# Patient Record
Sex: FEMALE | Race: WHITE
[De-identification: names, ages, dates, MRNs, and addresses within clinical notes are randomized per-mention and may not be internally consistent; named-entity substitution may affect disease eponyms.]

---

## 2018-04-26 NOTE — EDM.PDOC
ED HPI GENERAL MEDICAL PROBLEM





- General


Chief Complaint: Laceration


Time Seen by Provider: 03/18/18 17:30


Source of Information: Reports: Patient


History Limitations: Reports: No Limitations





- History of Present Illness


INITIAL COMMENTS - FREE TEXT/NARRATIVE: 





Pt. states that she fell on the ice getting out of her truck. She sustained a 

laceration to the volar aspect of her R forearm, and landed on her elbows, 

injuring both her elbows and her shoulders. Did not strike her head. No LOC. 

Denies any neck pain post fall. States that her tetanus is not UTD.


Onset: Today


Location: Reports: Upper Extremity, Right


Quality: Reports: Ache (both shoulders)


Severity: Moderate


Improves with: Reports: Rest


Worsens with: Reports: Movement





- Related Data


 Allergies











Allergy/AdvReac Type Severity Reaction Status Date / Time


 


No Known Allergies Allergy   Verified 03/18/18 17:40











Home Meds: 


 Home Meds





. [No Known Home Meds]  03/18/18 [History]











Past Medical History





- Past Health History


Medical/Surgical History: Denies Medical/Surgical History





Social & Family History





- Tobacco Use


Smoking Status *Q: Unknown Ever Smoked





ED ROS GENERAL





- Review of Systems


Review Of Systems: See Below


Respiratory: Reports: No Symptoms


Cardiovascular: Reports: No Symptoms


Musculoskeletal: Reports: No Symptoms, Shoulder Pain, Muscle Stiffness, Other (

elbow pain).  Denies: Neck Pain


Skin: Reports: No Symptoms


Neurological: Reports: No Symptoms





ED EXAM, SKIN/RASH


Exam: See Below


Exam Limited By: No Limitations


General Appearance: Alert, WD/WN, No Apparent Distress


Head: Atraumatic, Normocephalic


Neck: Normal Inspection, Supple, Non-Tender, Full Range of Motion


Respiratory/Chest: No Respiratory Distress, Lungs Clear, Normal Breath Sounds, 

No Accessory Muscle Use, Chest Non-Tender


Peripheral Pulses: 4+: Radial (L), Radial (R)


Back Exam: Normal Inspection, Full Range of Motion, NT


Extremities: Normal Range of Motion, Normal Capillary Refill, Other (pain on 

manipulation of shoulders and elbows. 2x3 cm v-shaped laceration to volar 

aspect of R forearm)


Neurological: Alert, Oriented, CN II-XII Intact, Normal Cognition, Normal Gait, 

Normal Reflexes, No Motor/Sensory Deficits


Psychiatric: Normal Affect, Normal Mood


Skin: Warm, Dry, Intact, Normal Color, No Rash





ED SKIN PROCEDURES





- Laceration/Wound Repair


  ** Right Ventral Arm


Lac/Wound length In cm: 5


Appearance: Subcutaneous


Distal NVT: Neuro & Vascular Intact, No Tendon Injury


Anesthetic Type: Local


Local Anesthesia - Lidocaine (Xylocaine): 1% Plain


Local Anesthetic Volume: 4cc


Skin Prep: Chlorhexidine (Hibiciens), Saline


Exploration/Debridement/Repair: Wound Explored, Explored to Base, Minimal 

Debridement


Closed with: Sutures


Suture Size: 4-0


# of Sutures: 3


Suture Type: Nylon





Course





- Vital Signs


Last Recorded V/S: 





 Last Vital Signs











Temp  36.1 C   03/18/18 17:30


 


Pulse  88   03/18/18 17:30


 


Resp  14   03/18/18 17:30


 


BP  150/62 H  03/18/18 17:30


 


Pulse Ox  100   03/18/18 17:30














- Orders/Labs/Meds


Orders: 





 Active Orders 24 hr











 Category Date Time Status


 


 Vaccines to be Administered [RC] PER UNIT ROUTINE Care  03/18/18 18:12 Ordered











Meds: 





Medications














Discontinued Medications














Generic Name Dose Route Start Last Admin





  Trade Name Shelia  PRN Reason Stop Dose Admin


 


Diphtheria/Tetanus/Acell Pertussis  0.5 ml  03/18/18 18:11  03/18/18 18:16





  Adacel  IM  03/18/18 18:12  0.5 ml





  .ONCE ONE   Administration


 


Lidocaine HCl  30 ml  03/18/18 17:44  03/18/18 17:49





  Xylocaine-Mpf 1%  INJECT  03/18/18 17:45  30 ml





  ONETIME ONE   Administration














Departure





- Departure


Time of Disposition: 18:20


Disposition: Home, Self-Care 01


Condition: Good


Clinical Impression: 


 Laceration








- Discharge Information


Instructions:  Laceration Care, Adult


Forms:  ED Department Discharge


Additional Instructions: 


Keep dry for 24 hours. 





Keep covered if still oozing blood, otherwise keep open to air as much as 

possible.





Return if redness, swelling, or discharge from the area.





Return if increased discomfort in shoulder, elbow, or elsewhere.





Suture out in 12 days.





- My Orders


Last 24 Hours: 





My Active Orders





03/18/18 18:12


Vaccines to be Administered [RC] PER UNIT ROUTINE 














- Assessment/Plan


Last 24 Hours: 





My Active Orders





03/18/18 18:12


Vaccines to be Administered [RC] PER UNIT ROUTINE Billing Type: Third-Party Bill

## 2020-04-10 ENCOUNTER — HOSPITAL ENCOUNTER (EMERGENCY)
Dept: HOSPITAL 50 - VM.ED | Age: 45
Discharge: ANOTHER HOSPITAL | End: 2020-04-10
Payer: COMMERCIAL

## 2020-04-10 DIAGNOSIS — K46.9: Primary | ICD-10-CM

## 2020-04-10 DIAGNOSIS — Z98.84: ICD-10-CM

## 2020-04-10 DIAGNOSIS — Z90.49: ICD-10-CM

## 2020-04-10 DIAGNOSIS — F17.210: ICD-10-CM

## 2020-04-10 LAB
ANION GAP SERPL CALC-SCNC: 12.5 MMOL/L (ref 10–20)
CHLORIDE SERPL-SCNC: 99 MMOL/L (ref 98–107)
SODIUM SERPL-SCNC: 131 MMOL/L (ref 136–145)

## 2020-04-10 PROCEDURE — P9016 RBC LEUKOCYTES REDUCED: HCPCS

## 2020-04-10 RX ADMIN — KETOROLAC TROMETHAMINE ONE MG: 30 INJECTION, SOLUTION INTRAMUSCULAR at 19:49

## 2020-04-10 RX ADMIN — KETOROLAC TROMETHAMINE ONE: 30 INJECTION, SOLUTION INTRAMUSCULAR at 20:34

## 2020-04-10 NOTE — EDM.PDOC
ED HPI GENERAL MEDICAL PROBLEM





- General


Chief Complaint: Abdominal Pain


Stated Complaint: CHEST PAIN


Time Seen by Provider: 04/10/20 18:50


Source of Information: Reports: Patient


History Limitations: Reports: No Limitations





- History of Present Illness


INITIAL COMMENTS - FREE TEXT/NARRATIVE: 





Patient comes emergency department today from home with complaints of abdominal 

epigastric pain nausea and vomiting.  This patient had a gastric bypass about 7 

years ago and really has not had any problems since.  Starting about 4:00 this 

morning she developed a severe abdominal epigastric pain that has been 

continuous throughout the day.  She has been nauseated vomiting and dry 

heaving.  The pain goes into her back.  She has had her gallbladder removed in 

the past.  No fever no chills.  No hematuria dysuria or urinary frequency.  She 

has been passing flatus today and she is had normal bowel movements.


  ** Middle Epigastric


Pain Score (Numeric/FACES): 10





- Related Data


 Allergies











Allergy/AdvReac Type Severity Reaction Status Date / Time


 


No Known Allergies Allergy   Verified 04/10/20 18:54











Home Meds: 


 Home Meds





. [No Known Home Meds]  03/18/18 [History]











Past Medical History





- Past Health History


Medical/Surgical History: Denies Medical/Surgical History





- Past Surgical History


GI Surgical History: Reports: Bariatric Procedure, Cholecystectomy





Social & Family History





- Tobacco Use


Smoking Status *Q: Current Every Day Smoker


Years of Tobacco use: 12


Packs/Tins Daily: 1





- Alcohol Use


Days Per Week of Alcohol Use: 1


Number of Drinks Per Day: 4


Total Drinks Per Week: 4





- Recreational Drug Use


Recreational Drug Use: No





ED ROS GENERAL





- Review of Systems


Review Of Systems: Comprehensive ROS is negative, except as noted in HPI.





ED EXAM, GI/ABD





- Physical Exam


Exam: See Below


Exam Limited By: No Limitations


General Appearance: Alert, WD/WN, Moderate Distress


Eyes: Bilateral: EOMI


Ears: Normal External Exam


Nose: Normal Inspection


Throat/Mouth: Normal Inspection


Head: Atraumatic, Other


Neck: Normal Inspection, Supple


Respiratory/Chest: No Respiratory Distress, Lungs Clear, Normal Breath Sounds, 

No Accessory Muscle Use


Cardiovascular: Normal Peripheral Pulses, Regular Rate, Rhythm


GI/Abdominal Exam: Normal Bowel Sounds, Soft, Guarding (Epigastric region), 

Tender (Has generalized tenderness throughout her abdomen more noticeable in 

the epigastric region).  No: Distended, Rigid, Rebound


Back Exam: Normal Inspection


Extremities: Normal Inspection, Normal Range of Motion, No Pedal Edema


Neurological: Alert, Oriented, Normal Cognition, No Motor/Sensory Deficits


Psychiatric: Normal Affect, Normal Mood


Skin Exam: Intact, No Rash, Diaphoretic, Pallor





Course





- Vital Signs


Last Recorded V/S: 


 Last Vital Signs











Temp  37.0 C   04/10/20 18:35


 


Pulse  65   04/10/20 18:35


 


Resp  16   04/10/20 18:35


 


BP  159/63 H  04/10/20 18:35


 


Pulse Ox  98   04/10/20 18:35














- Orders/Labs/Meds


Orders: 


 Active Orders 24 hr











 Category Date Time Status


 


 Abdomen 2V AP Flat Upright [CR] Stat Exams  04/10/20 19:08 Taken


 


 Abdomen Pelvis w Cont [CT] Stat Exams  04/10/20 20:08 Taken


 


 RED BLOOD CELLS LP [BBK] Stat Lab  04/10/20 19:16 Results


 


 TYPE AND SCREEN [BBK] Stat Lab  04/10/20 19:16 Results


 


 UA RFX KALINA AND CULT IF INDIC [URIN] Stat Lab  04/10/20 19:06 Ordered


 


 Lactated Ringers [Ringers, Lactated] 1,000 ml Med  04/10/20 22:56 Active





 IV ONETIME   


 


 Sodium Chloride 0.9% [Saline Flush] Med  04/10/20 19:08 Active





 10 ml FLUSH ASDIRECTED PRN   


 


 Blood Transfusion Reflex Orders [OM.PC] Routine Oth  04/10/20 20:13 Ordered


 


 Peripheral IV Insertion Adult [OM.PC] Stat Oth  04/10/20 19:05 Ordered


 


 Transfuse PRBC [Transfuse Red Blood Cells] [COMM] Stat Oth  04/10/20 20:06 

Ordered








 Medication Orders





Lactated Ringer's (Ringers, Lactated)  1,000 mls @ 125 mls/hr IV ONETIME ONE


   Stop: 04/11/20 06:55


Sodium Chloride (Saline Flush)  10 ml FLUSH ASDIRECTED PRN


   PRN Reason: Keep Vein Open








Labs: 


 Laboratory Tests











  04/10/20 04/10/20 04/10/20 Range/Units





  19:16 19:16 19:16 


 


WBC  5.4    (4.0-10.0)  x10^3/uL


 


RBC  3.60 L    (4.00-5.50)  x10^6/uL


 


Hgb  5.5 L*    (12.0-16.0)  g/dL


 


Hct  21.6 L    (33.0-47.0)  %


 


MCV  60.0 L    (78.0-93.0)  fL


 


MCH  15.3 L    (26.0-32.0)  pg


 


MCHC  25.5 L    (32.0-36.0)  g/dL


 


RDW Coeff of Jeramie  22.0 H    (10.0-15.0)  %


 


Plt Count  359    (130-400)  x10^3/uL


 


Add Manual Diff  Yes    


 


Neutrophils % (Manual)  83 H    (50-80)  %


 


Lymphocytes % (Manual)  12 L    (25-50)  %


 


Monocytes % (Manual)  4    (2-11)  %


 


Eosinophils % (Manual)  1    (0-4)  %


 


Platelet Estimate  Adequate    


 


Hypochromasia  3+ marked H    


 


Anisocytosis  3+ marked H    


 


Tear Drop Cells  1+ slight H    


 


Ovalocytes  1+ slight H    


 


Sodium   131 L   (136-145)  mmol/L


 


Potassium   4.5   (3.5-5.1)  mmol/L


 


Chloride   99   ()  mmol/L


 


Carbon Dioxide   24   (21-32)  mmol/L


 


Anion Gap   12.5   (10-20)  mmol/L


 


BUN   7   (7-18)  mg/dL


 


Creatinine   0.5 L   (0.55-1.02)  mg/dL


 


Est Cr Clr Drug Dosing   134.41   mL/min


 


Estimated GFR (MDRD)   > 60   


 


Glucose   146 H   ()  mg/dL


 


Lactic Acid    1.3  (0.4-2.0)  mmol/L


 


Calcium   8.0 L   (8.5-10.1)  mg/dL


 


Corrected Calcium   8.40 L   (8.5-10.1)  mg/dL


 


Total Bilirubin   0.6   (0.2-1.0)  mg/dL


 


AST   20   (15-37)  U/L


 


ALT   20   (14-59)  U/L


 


Alkaline Phosphatase   102   ()  U/L


 


C-Reactive Protein   < 0.2   (<=0.9)  mg/dL


 


Total Protein   6.8   (6.4-8.2)  g/dL


 


Albumin   3.5   (3.4-5.0)  g/dL


 


Globulin   3.3   


 


Albumin/Globulin Ratio   1.06   


 


Blood Type     


 


Gel Antibody Screen     


 


Crossmatch     














  04/10/20 Range/Units





  19:16 


 


WBC   (4.0-10.0)  x10^3/uL


 


RBC   (4.00-5.50)  x10^6/uL


 


Hgb   (12.0-16.0)  g/dL


 


Hct   (33.0-47.0)  %


 


MCV   (78.0-93.0)  fL


 


MCH   (26.0-32.0)  pg


 


MCHC   (32.0-36.0)  g/dL


 


RDW Coeff of Jeramie   (10.0-15.0)  %


 


Plt Count   (130-400)  x10^3/uL


 


Add Manual Diff   


 


Neutrophils % (Manual)   (50-80)  %


 


Lymphocytes % (Manual)   (25-50)  %


 


Monocytes % (Manual)   (2-11)  %


 


Eosinophils % (Manual)   (0-4)  %


 


Platelet Estimate   


 


Hypochromasia   


 


Anisocytosis   


 


Tear Drop Cells   


 


Ovalocytes   


 


Sodium   (136-145)  mmol/L


 


Potassium   (3.5-5.1)  mmol/L


 


Chloride   ()  mmol/L


 


Carbon Dioxide   (21-32)  mmol/L


 


Anion Gap   (10-20)  mmol/L


 


BUN   (7-18)  mg/dL


 


Creatinine   (0.55-1.02)  mg/dL


 


Est Cr Clr Drug Dosing   mL/min


 


Estimated GFR (MDRD)   


 


Glucose   ()  mg/dL


 


Lactic Acid   (0.4-2.0)  mmol/L


 


Calcium   (8.5-10.1)  mg/dL


 


Corrected Calcium   (8.5-10.1)  mg/dL


 


Total Bilirubin   (0.2-1.0)  mg/dL


 


AST   (15-37)  U/L


 


ALT   (14-59)  U/L


 


Alkaline Phosphatase   ()  U/L


 


C-Reactive Protein   (<=0.9)  mg/dL


 


Total Protein   (6.4-8.2)  g/dL


 


Albumin   (3.4-5.0)  g/dL


 


Globulin   


 


Albumin/Globulin Ratio   


 


Blood Type  A POSITIVE  


 


Gel Antibody Screen  Negative  


 


Crossmatch  See Detail  











Meds: 


Medications











Generic Name Dose Route Start Last Admin





  Trade Name Freq  PRN Reason Stop Dose Admin


 


Lactated Ringer's  1,000 mls @ 125 mls/hr  04/10/20 22:56  





  Ringers, Lactated  IV  04/11/20 06:55  





  ONETIME ONE   





     





     





     





     


 


Sodium Chloride  10 ml  04/10/20 19:08  





  Saline Flush  FLUSH   





  ASDIRECTED PRN   





  Keep Vein Open   





     





     





     














Discontinued Medications














Generic Name Dose Route Start Last Admin





  Trade Name Freq  PRN Reason Stop Dose Admin


 


Al Hydroxide/Mg Hydroxide  30 ml  04/10/20 18:42  04/10/20 18:50





  Gi Cocktail  PO  04/10/20 18:43  30 ml





  ONETIME ONE   Administration





     





     





     





     


 


Diphenhydramine HCl  25 mg  04/10/20 19:07  04/10/20 19:51





  Benadryl  IVPUSH  04/10/20 19:08  25 mg





  ONETIME ONE   Administration





     





     





     





     


 


Fentanyl  100 mcg  04/10/20 22:46  04/10/20 23:00





  Sublimaze  IVPUSH  04/10/20 22:47  100 mcg





  ONETIME ONE   Administration





     





     





     





     


 


Haloperidol Lactate  2.5 mg  04/10/20 20:19  04/10/20 20:25





  Haldol  IV  04/10/20 20:20  2.5 mg





  ONETIME ONE   Administration





     





     





     





     


 


Hydromorphone HCl  0.5 mg  04/10/20 20:24  04/10/20 20:36





  Dilaudid  IVPUSH  04/10/20 20:25  0.5 mg





  ONETIME ONE   Administration





     





     





     





     


 


Hydromorphone HCl  0.5 mg  04/10/20 20:52  04/10/20 20:53





  Dilaudid  IVPUSH  04/10/20 20:53  0.5 mg





  ONETIME ONE   Administration





     





     





     





     


 


Hydromorphone HCl  1 mg  04/10/20 21:58  04/10/20 22:20





  Dilaudid  IVPUSH  04/10/20 21:59  1 mg





  ONETIME ONE   Administration





     





     





     





     


 


Lactated Ringer's  1,000 mls @ 999 mls/hr  04/10/20 19:07  04/10/20 19:40





  Ringers, Lactated  IV  04/10/20 20:07  999 mls/hr





  ONETIME ONE   Administration





     





     





     





     


 


Piperacillin Sod/Tazobactam  100 mls @ 200 mls/hr  04/10/20 22:41  04/10/20 23:

10





  Sod 3.375 gm/ Sodium Chloride  IV  04/10/20 23:10  200 mls/hr





  STAT ONE   Administration





     





     





     





     


 


Iopamidol  100 ml  04/10/20 20:31  04/10/20 20:33





  Isovue-300 (61%)  IVPUSH  04/10/20 20:32  100 ml





  ONETIME ONE   Administration





     





     





     





     


 


Ketorolac Tromethamine  30 mg  04/10/20 19:07  04/10/20 20:34





  Toradol  IM  04/10/20 19:08  Not Given





  ONETIME ONE   





     





     





     





     


 


Ketorolac Tromethamine  30 mg  04/10/20 20:03  04/10/20 19:44





  Toradol  IVPUSH  04/10/20 20:04  30 mg





  ONETIME ONE   Administration





     





     





     





     


 


Ondansetron HCl  4 mg  04/10/20 19:07  04/10/20 19:46





  Zofran  IV  04/10/20 19:08  4 mg





  ONETIME ONE   Administration





     





     





     





     


 


Piperacillin Sod/Tazobactam Sod  Confirm  04/10/20 23:00  





  Zosyn  Administered  04/10/20 23:01  





  Dose   





  3.375 gm   





  .ROUTE   





  .STK-MED ONE   





     





     





     





     














- Re-Assessments/Exams


Free Text/Narrative Re-Assessment/Exam: 





04/10/20 20:33


V of LR 1 L wide open.


Benadryl 25 mg IV push.


Zofran 4 mg IV push.


Ketorolac 30 mg IV push


 x-ray of the abdomen shows moderate bowel distention consider follow-up CT 

abdomen pelvis with IV and oral contrast. 





The patient is still vomiting quite regularly so unable to complete oral 

contrast will do just with IV contrast. 





Haldol 2.5mg IVP for nausea and Dilaudid 0.5mg IVP





The patients hgb is only 5.5 no recent bleeding and the breakdown of the 

indocies leads me to believe that this is really chronic iron deficiency anemia 

due to poor iron absorption from her gastric bypass. 





We will cross 2 units and start 1 unit at this time because her pain is really 

not getting better with multiple doses of dilaudid and she is still quite 

uncomfortable.








04/10/20 23:12


CT of the abdomen and pelvis per radiology shows mesenteric edema mucosal 

thickening small bowel loops in the hemiabdomen.  Swirling of mesentery in the 

mid abdomen with dilation of distal venous vasculature suggesting component of 

venous obstruction.





I called and spoke with Dr. Ward at [facility] in [place]. HPI ER COURSE 

findings and concerns were relayed to him verbally over the phone. He did have 

some delay seeing the images and when he was finally able to quickly noted that 

this patient had an internal hernia and this is a surgical emergency and 

transport her ASAP to [facility] in the ER where he will see the patient in the 

ED. I did reiterate the patients hgb to Dr Ward to ensure that he is aware of 

the most likely chronically low hgb. 





I spoke with the patient about the concerns of the internal hernia and 

explained the pathology.  She is understanding of this and her questions 

answered. 





Fentanyl finally did assist with the patients pain and we also gave the patient 

a dose of Zosyn prior to transport. 








Departure





- Departure


Time of Disposition: 22:40


Disposition: DC/Tfer to [facility]


Clinical Impression: 


 Internal hernia








- Discharge Information


Referrals: 


Deanna Blank DO [Primary Care Provider] - 


Forms:  ED Department Discharge, Interfacility Transfer EMTALA





Sepsis Event Note





- Evaluation


Sepsis Screening Result: No Definite Risk





- Focused Exam


Vital Signs: 


 Vital Signs











  Temp Pulse Resp BP Pulse Ox


 


 04/10/20 18:35  37.0 C  65  16  159/63 H  98











Date Exam was Performed: 04/10/20


Time Exam was Performed: 23:16





- My Orders


Last 24 Hours: 


My Active Orders





04/10/20 19:05


Peripheral IV Insertion Adult [OM.PC] Stat 





04/10/20 19:06


UA RFX KALINA AND CULT IF INDIC [URIN] Stat 





04/10/20 19:08


Abdomen 2V AP Flat Upright [CR] Stat 


Sodium Chloride 0.9% [Saline Flush]   10 ml FLUSH ASDIRECTED PRN 





04/10/20 19:16


RED BLOOD CELLS LP [BBK] Stat 


TYPE AND SCREEN [BBK] Stat 





04/10/20 20:06


Transfuse PRBC [Transfuse Red Blood Cells] [COMM] Stat 





04/10/20 20:08


Abdomen Pelvis w Cont [CT] Stat 





04/10/20 20:13


Blood Transfusion Reflex Orders [OM.PC] Routine 





04/10/20 22:56


Lactated Ringers [Ringers, Lactated] 1,000 ml IV ONETIME 














- Assessment/Plan


Last 24 Hours: 


My Active Orders





04/10/20 19:05


Peripheral IV Insertion Adult [OM.PC] Stat 





04/10/20 19:06


UA RFX KALINA AND CULT IF INDIC [URIN] Stat 





04/10/20 19:08


Abdomen 2V AP Flat Upright [CR] Stat 


Sodium Chloride 0.9% [Saline Flush]   10 ml FLUSH ASDIRECTED PRN 





04/10/20 19:16


RED BLOOD CELLS LP [BBK] Stat 


TYPE AND SCREEN [BBK] Stat 





04/10/20 20:06


Transfuse PRBC [Transfuse Red Blood Cells] [COMM] Stat 





04/10/20 20:08


Abdomen Pelvis w Cont [CT] Stat 





04/10/20 20:13


Blood Transfusion Reflex Orders [OM.PC] Routine 





04/10/20 22:56


Lactated Ringers [Ringers, Lactated] 1,000 ml IV ONETIME 











Assessment:: 





Internal hernia.


Plan: 





Transport emergently to [facility] for further care and management to See 

Dr. Ward in the ED for emergent surgery.

## 2020-04-11 NOTE — CT
______________________________________________________________________________   

  

[id number] CT/CT Abdomen Pelvis W IV  

EXAM:   

   

 CT Abdomen Pelvis W IV  

   

 CLINICAL DATA:   

   

 ABDOMINAL PAIN  

   

 ANEMIA  

   

 COMPARISON:   

   

 CORRELATION IS MADE WITH SEPTEMBER 4, 2013  

   

 FINDINGS:   

   

 Gastric bypass surgical changes are seen  

   

 The gallbladder has been removed  

   

 A 1.5 cm hepatic hypodensity in segment VI is stable  

   

 Mesenteric venous vascular structures are prominent but stable  

   

 There is a swirling appearance of mesentery again seen  

   

 There is no extravasation of vascular contrast  

   

 The appendix is thought to be present and normal  

   

 The pelvis shows no mass or adenopathy  

   

 There is no free fluid  

   

 The aorta, adrenals, kidneys, spleen, and pancreas are unremarkable  

   

 IMPRESSION:  

   

 NO SITE OF BLOOD LOSS IDENTIFIED  

   

 ENDOSCOPY, CT ANGIOGRAPHY, AND NUCLEAR IMAGING MAY BE HELPFUL   

   

 IF THERE IS GI BLOOD LOSS  

   

 Electronically signed by Jason Iglesias MD on 4/11/2020 9:28 AM  

   

  

Jason Iglesias MD                 

 04/11/20 0931    

  

Thank you for allowing us to participate in the care of your patient.

## 2020-04-14 NOTE — CR
______________________________________________________________________________   

  

[id number] RAD/RAD Abd Flat and Upright 2V  

Exam:   

   

 RAD Abd Flat and Upright 2V  

   

 Clinical Data:   

   

 ABDOMINAL PAIN  

   

 COMPARISON:   

   

 NO PREVIOUS SIMILAR EXAM IS AVAILABLE  

   

 FINDINGS:   

   

 There is moderate bowel distention seen  

   

 There is a history of gastric bypass  

   

 There is no free air  

   

 There is no organomegaly or pathologic calcification   

   

 IMPRESSION:  

   

 MODERATE BOWEL DISTENTION  

   

 CONSIDER FOLLOW-UP CT ABDOMEN PELVIS PREFERABLY WITH IV AND ORAL CONTRAST  

   

 Electronically signed by Jason Iglesias MD on 4/10/2020 8:03 PM  

   

  

Jason Iglesias MD                 

 04/14/20 0814    

  

Thank you for allowing us to participate in the care of your patient.

## 2020-08-26 ENCOUNTER — HOSPITAL ENCOUNTER (INPATIENT)
Dept: HOSPITAL 11 - JP.MS | Age: 45
LOS: 10 days | Discharge: HOME | DRG: 220 | End: 2020-09-05
Attending: SURGERY | Admitting: SURGERY
Payer: COMMERCIAL

## 2020-08-26 DIAGNOSIS — K91.2: ICD-10-CM

## 2020-08-26 DIAGNOSIS — E61.0: ICD-10-CM

## 2020-08-26 DIAGNOSIS — Z98.84: ICD-10-CM
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
## 2020-08-26 DIAGNOSIS — Z79.899: ICD-10-CM

## 2020-08-26 DIAGNOSIS — E66.01: ICD-10-CM

## 2020-08-26 DIAGNOSIS — F17.210: ICD-10-CM

## 2020-08-26 DIAGNOSIS — I49.5: ICD-10-CM

## 2020-08-26 DIAGNOSIS — K21.9: ICD-10-CM

## 2020-08-26 DIAGNOSIS — K95.89: Primary | ICD-10-CM

## 2020-08-26 DIAGNOSIS — Z90.49: ICD-10-CM

## 2020-08-26 DIAGNOSIS — D50.9: ICD-10-CM

## 2020-08-26 DIAGNOSIS — E43: ICD-10-CM

## 2020-08-26 DIAGNOSIS — I10: ICD-10-CM

## 2020-08-26 DIAGNOSIS — K56.51: ICD-10-CM

## 2020-08-26 DIAGNOSIS — E60: ICD-10-CM

## 2020-08-26 DIAGNOSIS — Z98.890: ICD-10-CM

## 2020-08-26 DIAGNOSIS — E50.9: ICD-10-CM

## 2020-08-26 DIAGNOSIS — K56.2: ICD-10-CM

## 2020-08-26 LAB — HBA1C BLD-MCNC: 4.2 % (ref 4.5–6.2)

## 2020-08-26 PROCEDURE — U0002 COVID-19 LAB TEST NON-CDC: HCPCS

## 2020-08-26 PROCEDURE — C9113 INJ PANTOPRAZOLE SODIUM, VIA: HCPCS

## 2020-08-26 PROCEDURE — P9047 ALBUMIN (HUMAN), 25%, 50ML: HCPCS

## 2020-08-26 PROCEDURE — P9016 RBC LEUKOCYTES REDUCED: HCPCS

## 2020-08-26 RX ADMIN — SODIUM CHLORIDE SCH MLS/HR: 9 INJECTION, SOLUTION INTRAVENOUS at 16:31

## 2020-08-26 RX ADMIN — SODIUM CHLORIDE SCH MLS/HR: 9 INJECTION, SOLUTION INTRAVENOUS at 18:35

## 2020-08-26 RX ADMIN — SODIUM CHLORIDE SCH MLS/HR: 9 INJECTION, SOLUTION INTRAVENOUS at 14:30

## 2020-08-26 NOTE — CT
Abdomen Pelvis w Cont

 

CLINICAL HISTORY: Loose stools, malnutrition  

 

COMPARISON: None. 

 

TECHNIQUE: Transverse scans were obtained from the base of the lungs to the

pubic symphysis following oral contrast and IV infusion of contrast.Auto dosage

reduction and iterative reconstructiontechniques employed.

 

FINDINGS: Patient has had previous gastric surgery. There is some fluid in the

stomach as well as the descending duodenum. The duodenum narrows as it crosses

the aorta behind the SMA. There is generalized small bowel distention in a

nonspecific pattern. There is mucosal thickening involving the right colon The

lung bases are clear. The liver shows diffuse steatosis. There is a

low-attenuation focus in the posterior segment of the right lobe of the liver

measuring 1.2 x 1.4 cm. The gallbladder has been removed. The spleen has a

normal size and shape. The pancreas shows no mass or inflammatory change The

adrenal glands appear normal bilaterally . The kidneys show no mass, stones or

hydronephrosis. There is a 1 cm cyst in the upper pole of the right kidney. The

ureters have a normal course and caliber. The bladder has normal contour. Uterus

is enlarged and heterogeneous with multiple nodules consistent with fibroids.

There is a 90 edema [address] place The aorta has a normal contour.

There is no suspicious retroperitoneal adenopathy. 

 

IMPRESSION: Previous gastric surgery

 

Fluid-filled mildly dilated descending and proximal transverse duodenum which

narrows at the crossing of the aorta and SMA.

 

Diffuse mucosal thickening involving the right colon

 

Mild generalized small bowel distention in a nonspecific pattern

 

Previous cholecystectomy

 

1.2 x 1.4 cm low-attenuation lesion in the right lobe of the liver. This is

indeterminate. Should be correlated with ultrasound.

## 2020-08-27 PROCEDURE — 02HV33Z INSERTION OF INFUSION DEVICE INTO SUPERIOR VENA CAVA, PERCUTANEOUS APPROACH: ICD-10-PCS | Performed by: SURGERY

## 2020-08-27 RX ADMIN — LEUCINE, PHENYLALANINE, LYSINE, METHIONINE, ISOLEUCINE, VALINE, HISTIDINE, THREONINE, TRYPTOPHAN, ALANINE, GLYCINE, ARGININE, PROLINE, SERINE, TYROSINE, SODIUM ACETATE, DIBASIC POTASSIUM PHOSPHATE, MAGNESIUM CHLORIDE, SODIUM CHLORIDE, CALCIUM CHLORIDE, DEXTROSE SCH MLS/HR
365; 280; 290; 200; 300; 290; 240; 210; 90; 1035; 515; 575; 340; 250; 20; 340; 261; 51; 59; 33; 15 INJECTION INTRAVENOUS at 17:50

## 2020-08-27 RX ADMIN — I.V. FAT EMULSION SCH MLS/HR: 20 EMULSION INTRAVENOUS at 17:51

## 2020-08-27 NOTE — PN
DATE OF SERVICE:  08/27/2020

 

SUBJECTIVE:  Rojelio is a direct admit from [facility] yesterday for severe

protein malnutrition, diarrhea.  See copy of history and physical scanned into [employer]

electronic medical record.  This morning, Rojelio denies pain.  Vital signs have been

stable.  She is n.p.o. to have a Goff catheter placed.  Oral intake 1260.  Urine output

1500.

 

REVIEW OF SYSTEMS:  CONSTITUTIONAL:  Denies any headache, dizziness.

CHEST:  No chest pain, shortness of breath, or fast irregular heartbeat.

Has had 1 stool that was formed.

:  No UTI signs and symptoms.

MUSCULOSKELETAL:  Reports less musculoskeletal pain.  Continues to have the peripheral lower

extremity edema.

PSYCHIATRIC:  No depression or anxiety.

SKIN:  No rash.

All 12 systems were reviewed and negative for any positives or negatives in addition to

chief complaint.

 

LABORATORY DATA:  Hemoglobin 10.1 this morning.  Potassium on admission was 2.7 and today it

is 3.7 after 60 mEq of KCl IV.  Calcium 7.1, total protein 3.4 (normal 6.4 to 8.2), albumin

1.4 (3.4 to 5.4).

 

OBJECTIVE:  GENERAL:  Rojelio Bernardo is a pleasant 45-year-old female.

VITAL SIGNS:  Height 5 feet 6 inches, weight is 145 pounds, BMI 23.4.  TPR at 0200, 96.4,

60, 16, blood pressure 101/71.

HEENT:  Negative.

NECK:  Supple.

HEART:  Regular rate and rhythm.

LUNGS:  Clear.

ABDOMEN: Soft.  There is some firmness in the left upper mid quadrant and minimal tenderness

with palpation.

EXTREMITIES:  Reveal 1+ peripheral edema.

NEURO:  Intact.

PSYCHIATRIC:  Mood and affect appropriate.

SKIN:  Without rash.

 

ASSESSMENT:

1. Severe protein malnutrition.

2. Low albumin.

3. Iron deficiency anemia.  Recently received iron infusions.

4. 30-pound weight loss.

5. Diarrhea, greater than 10 stools.

6. Status post Ranjeet-en-Y gastric bypass surgery.

7. Unspecified surgical malabsorption.

8. B12 deficiency.

 

PLAN:

1. Remain n.p.o. for placement of Goff catheter.

2. Albumin 50 g IV daily for 4 days.

3. Ultrasound of liver for a 1.2 x 1.4 cm low-attenuation lesion, right lobe of liver, per

    request of radiologist.

4. Obtain original operative report of Ranjeet-en-Y gastric bypass surgery.

5. Additional orders if needed will be written post placement of Goff catheter.  Will

    evaluate p.r.n. or in the a.m.

 

 

 

 

Nancy Boo PA-C

DD:  08/27/2020 07:23:49

DT:  08/27/2020 08:05:29

Job #:  [id number]

## 2020-08-27 NOTE — US
Abdomen Ltd

 

CLINICAL HISTORY: Liver lesion on current CT

 

FINDINGS: Real-time images through the liver show no focal mass, cyst or

intrahepatic ductal dilatation.

 

IMPRESSION: Hypodense lesion seen on current CT is not identified by ultrasound.

This is still felt to be a real lesion on CT. It may be isoechoic on ultrasound.

MRI of the liver should be considered

## 2020-08-28 RX ADMIN — HEPARIN SODIUM (PORCINE) LOCK FLUSH IV SOLN 100 UNIT/ML PRN UNITS: 100 SOLUTION at 05:33

## 2020-08-28 RX ADMIN — LEUCINE, PHENYLALANINE, LYSINE, METHIONINE, ISOLEUCINE, VALINE, HISTIDINE, THREONINE, TRYPTOPHAN, ALANINE, GLYCINE, ARGININE, PROLINE, SERINE, TYROSINE, SODIUM ACETATE, DIBASIC POTASSIUM PHOSPHATE, MAGNESIUM CHLORIDE, SODIUM CHLORIDE, CALCIUM CHLORIDE, DEXTROSE SCH MLS/HR
365; 280; 290; 200; 300; 290; 240; 210; 90; 1035; 515; 575; 340; 250; 20; 340; 261; 51; 59; 33; 15 INJECTION INTRAVENOUS at 20:28

## 2020-08-28 RX ADMIN — HEPARIN SODIUM (PORCINE) LOCK FLUSH IV SOLN 100 UNIT/ML PRN UNITS: 100 SOLUTION at 07:32

## 2020-08-28 RX ADMIN — LEUCINE, PHENYLALANINE, LYSINE, METHIONINE, ISOLEUCINE, VALINE, HISTIDINE, THREONINE, TRYPTOPHAN, ALANINE, GLYCINE, ARGININE, PROLINE, SERINE, TYROSINE, SODIUM ACETATE, DIBASIC POTASSIUM PHOSPHATE, MAGNESIUM CHLORIDE, SODIUM CHLORIDE, CALCIUM CHLORIDE, DEXTROSE SCH
365; 280; 290; 200; 300; 290; 240; 210; 90; 1035; 515; 575; 340; 250; 20; 340; 261; 51; 59; 33; 15 INJECTION INTRAVENOUS at 10:16

## 2020-08-28 RX ADMIN — I.V. FAT EMULSION SCH MLS/HR: 20 EMULSION INTRAVENOUS at 18:35

## 2020-08-28 RX ADMIN — LEUCINE, PHENYLALANINE, LYSINE, METHIONINE, ISOLEUCINE, VALINE, HISTIDINE, THREONINE, TRYPTOPHAN, ALANINE, GLYCINE, ARGININE, PROLINE, SERINE, TYROSINE, SODIUM ACETATE, DIBASIC POTASSIUM PHOSPHATE, MAGNESIUM CHLORIDE, SODIUM CHLORIDE, CALCIUM CHLORIDE, DEXTROSE SCH MLS/HR
365; 280; 290; 200; 300; 290; 240; 210; 90; 1035; 515; 575; 340; 250; 20; 340; 261; 51; 59; 33; 15 INJECTION INTRAVENOUS at 09:51

## 2020-08-28 NOTE — PN
DATE OF SERVICE:  08/28/2020

 

SUBJECTIVE:  Rojelio has had 3 bowel movements yesterday.  She did request to be changed

from a step-4 diet to a regular diet, and since changing to a regular diet, she has had

multiple bowel movements, which are associated more with dumping.  She also has what sounds

like lactose intolerance because she has a lot of gas bloating and diarrhea after drinking

milk.  Albumin continues to be low at 2.  Today, globulin is 1.8 and total protein is 3.8.

She had a Goff catheter placed yesterday and has been started on TPN.  Hemoglobin is 9.9.

 

REVIEW OF SYSTEMS:  Denies any headache, dizziness, loss of coordination, chest pain,

shortness of breath, or cough.  Denies any nausea, vomiting, diarrhea, or constipation.  No

abdominal pain.

EXTREMITIES:  Less peripheral edema.

NEUROLOGIC:  Negative.

PSYCHIATRIC:  Negative.

 

Remainder of review of systems negative for any pertinent positives and negatives.

 

OBJECTIVE:  GENERAL:  Rojelio Bernardo is a pleasant 45-year-old female.  She is alert and

orientated.

VITAL SIGNS:  TPR at 0700 of 95.9, 54, and 16. Blood pressure 115/71.

HEENT:  Negative.

NECK:  Supple.

HEART:  Regular rate and rhythm.

LUNGS:  Clear.

ABDOMEN:  Soft, flat, and nontender.

EXTREMITIES:  Without peripheral edema.

 

ASSESSMENT:

1. Severe protein malnutrition.

2. Low albumin.

3. Iron-deficiency anemia.

4. A 30-pound weight loss.

5. Diarrhea.

6. Status post Ranjeet-en-Y gastric bypass surgery.

7. Unspecified surgical malabsorption.

8. B12 deficiency.

 

PLAN:

1. Continue same TPN rate and content.

2. Check CBC, CMP, magnesium, and phosphorus and may shower.

3. We will evaluate p.r.n. or in a.m.

 

 

 

 

Nancy Boo PA-C

DD:  08/28/2020 14:05:05

DT:  08/28/2020 16:52:48

Job #:  [id number]

## 2020-08-29 RX ADMIN — LEUCINE, PHENYLALANINE, LYSINE, METHIONINE, ISOLEUCINE, VALINE, HISTIDINE, THREONINE, TRYPTOPHAN, ALANINE, GLYCINE, ARGININE, PROLINE, SERINE, TYROSINE, SODIUM ACETATE, DIBASIC POTASSIUM PHOSPHATE, MAGNESIUM CHLORIDE, SODIUM CHLORIDE, CALCIUM CHLORIDE, DEXTROSE SCH MLS/HR
365; 280; 290; 200; 300; 290; 240; 210; 90; 1035; 515; 575; 340; 250; 20; 340; 261; 51; 59; 33; 15 INJECTION INTRAVENOUS at 07:21

## 2020-08-29 RX ADMIN — I.V. FAT EMULSION SCH MLS/HR: 20 EMULSION INTRAVENOUS at 17:57

## 2020-08-29 RX ADMIN — LEUCINE, PHENYLALANINE, LYSINE, METHIONINE, ISOLEUCINE, VALINE, HISTIDINE, THREONINE, TRYPTOPHAN, ALANINE, GLYCINE, ARGININE, PROLINE, SERINE, TYROSINE, SODIUM ACETATE, DIBASIC POTASSIUM PHOSPHATE, MAGNESIUM CHLORIDE, SODIUM CHLORIDE, CALCIUM CHLORIDE, DEXTROSE SCH MLS/HR
365; 280; 290; 200; 300; 290; 240; 210; 90; 1035; 515; 575; 340; 250; 20; 340; 261; 51; 59; 33; 15 INJECTION INTRAVENOUS at 17:57

## 2020-08-30 RX ADMIN — VITAMIN A PALMITATE SCH UNIT: 15 INJECTION, SOLUTION INTRAMUSCULAR at 09:32

## 2020-08-30 RX ADMIN — I.V. FAT EMULSION SCH MLS/HR: 20 EMULSION INTRAVENOUS at 17:48

## 2020-08-30 RX ADMIN — LEUCINE, PHENYLALANINE, LYSINE, METHIONINE, ISOLEUCINE, VALINE, HISTIDINE, THREONINE, TRYPTOPHAN, ALANINE, GLYCINE, ARGININE, PROLINE, SERINE, TYROSINE, SODIUM ACETATE, DIBASIC POTASSIUM PHOSPHATE, MAGNESIUM CHLORIDE, SODIUM CHLORIDE, CALCIUM CHLORIDE, DEXTROSE SCH MLS/HR
365; 280; 290; 200; 300; 290; 240; 210; 90; 1035; 515; 575; 340; 250; 20; 340; 261; 51; 59; 33; 15 INJECTION INTRAVENOUS at 14:39

## 2020-08-30 RX ADMIN — LEUCINE, PHENYLALANINE, LYSINE, METHIONINE, ISOLEUCINE, VALINE, HISTIDINE, THREONINE, TRYPTOPHAN, ALANINE, GLYCINE, ARGININE, PROLINE, SERINE, TYROSINE, SODIUM ACETATE, DIBASIC POTASSIUM PHOSPHATE, MAGNESIUM CHLORIDE, SODIUM CHLORIDE, CALCIUM CHLORIDE, DEXTROSE SCH MLS/HR
365; 280; 290; 200; 300; 290; 240; 210; 90; 1035; 515; 575; 340; 250; 20; 340; 261; 51; 59; 33; 15 INJECTION INTRAVENOUS at 03:53

## 2020-08-30 NOTE — PN
DATE OF SERVICE:  08/30/2020

 

The patient has been afebrile with stable vital signs.  The patient was still having

frequent bowel movements, although bit more formed.  The plan will be to proceed with an

exploratory laparotomy with release of the small bowel obstruction and possible bowel

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

as well as release of any duodenal bands that might be contributing to that tight angle

between the superior mesenteric artery and aorta.  Her hemoglobin was 8.6, and we will give

her 1 unit of packed RBCs today and some Lasix after that.  Otherwise, plan is to proceed

with exploration as outlined above tomorrow.  Potential risks of the procedure were

reviewed, and the patient wishes to proceed.

 

One additional note is her vitamin A level came back extremely low at 7.7.  We will initiate

vitamin A replenishment with 100,000 units IM today and then x3 days and then transition

over to oral doses thereafter.  This is more evident with the patient having quite a bit of

problems with issues of malabsorption.

 

 

 

 

Terence Lopez MD

DD:  08/30/2020 07:05:28

DT:  08/30/2020 14:00:06

Job #:  [id number]

## 2020-08-30 NOTE — PN
DATE OF SERVICE:  08/29/2020

 

The patient has been afebrile with stable vital signs.  She did have a large amount of bowel

activity yesterday, multiple bowel movements in large volume.  Carnivore diet did include

some sugar and mild products, and she will be instructed to try and back down those.

Otherwise, we will continue the TPN.  Her chloride is getting somewhat high and we will

change the electrolyte formula to try to get the chloride come down more toward normal, and

hemoglobin was also down to 8.8.  Again, she has not been losing any blood, but this is an

__________ phenomenon likely related to her poorly nourished state.  Plan at this point

would be to do an exploratory laparotomy on Monday.  This would include treatment of the

superior mesenteric artery syndrome along with looking for any adhesions or problems related

to her small bowel at this point as this problem with the diarrhea and this bowel movements

occurred after previous exploration in [place].  We will just continue the albumin

supplementation, recheck labs, and type and cross with 2 units of packed RBCs tomorrow.

 

 

 

 

Terence Lopez MD

DD:  08/29/2020 07:14:29

DT:  08/30/2020 14:07:16

Job #:  [id number]

## 2020-08-31 RX ADMIN — VITAMIN A PALMITATE SCH UNIT: 15 INJECTION, SOLUTION INTRAMUSCULAR at 08:34

## 2020-08-31 RX ADMIN — I.V. FAT EMULSION SCH MLS/HR: 20 EMULSION INTRAVENOUS at 17:57

## 2020-08-31 RX ADMIN — LEUCINE, PHENYLALANINE, LYSINE, METHIONINE, ISOLEUCINE, VALINE, HISTIDINE, THREONINE, TRYPTOPHAN, ALANINE, GLYCINE, ARGININE, PROLINE, SERINE, TYROSINE, SODIUM ACETATE, DIBASIC POTASSIUM PHOSPHATE, MAGNESIUM CHLORIDE, SODIUM CHLORIDE, CALCIUM CHLORIDE, DEXTROSE SCH MLS/HR
365; 280; 290; 200; 300; 290; 240; 210; 90; 1035; 515; 575; 340; 250; 20; 340; 261; 51; 59; 33; 15 INJECTION INTRAVENOUS at 20:30

## 2020-08-31 RX ADMIN — LEUCINE, PHENYLALANINE, LYSINE, METHIONINE, ISOLEUCINE, VALINE, HISTIDINE, THREONINE, TRYPTOPHAN, ALANINE, GLYCINE, ARGININE, PROLINE, SERINE, TYROSINE, SODIUM ACETATE, DIBASIC POTASSIUM PHOSPHATE, MAGNESIUM CHLORIDE, SODIUM CHLORIDE, CALCIUM CHLORIDE, DEXTROSE SCH MLS/HR
365; 280; 290; 200; 300; 290; 240; 210; 90; 1035; 515; 575; 340; 250; 20; 340; 261; 51; 59; 33; 15 INJECTION INTRAVENOUS at 00:52

## 2020-08-31 RX ADMIN — LEUCINE, PHENYLALANINE, LYSINE, METHIONINE, ISOLEUCINE, VALINE, HISTIDINE, THREONINE, TRYPTOPHAN, ALANINE, GLYCINE, ARGININE, PROLINE, SERINE, TYROSINE, SODIUM ACETATE, DIBASIC POTASSIUM PHOSPHATE, MAGNESIUM CHLORIDE, SODIUM CHLORIDE, CALCIUM CHLORIDE, DEXTROSE SCH MLS/HR
365; 280; 290; 200; 300; 290; 240; 210; 90; 1035; 515; 575; 340; 250; 20; 340; 261; 51; 59; 33; 15 INJECTION INTRAVENOUS at 10:24

## 2020-08-31 NOTE — PN
DATE OF SERVICE:  08/31/2020

 

SUBJECTIVE:  Rojelio is n.p.o.  She will be having surgery today.  She had a Goff placed

last week.  Vitamin A is 7.7.  She received 1 unit of packed red blood cells over the

weekend for a hemoglobin of 8.8.

 

REVIEW OF SYSTEMS:  Remainder of review of systems negative for any pertinent positives and

negatives.

 

OBJECTIVE:  GENERAL:  Rojelio Bernardo is a pleasant 45-year-old female.  She is alert and

orientated.  Color pale.  TPN running without any difficulty.

VITAL SIGNS:  TPR at 0708 is 96.6; 56; 16; blood pressure 121/71.

HEENT:  Negative.

NECK:  Supple.

HEART:  Regular rate and rhythm.

LUNGS:  Clear.

ABDOMEN:  Soft, nontender.

EXTREMITIES:  Without peripheral edema.

 

ASSESSMENT:  Severe protein malnutrition, low albumin, iron deficiency anemia, 30-pound

weight loss, diarrhea, status post Ranjeet-en-Y gastric bypass surgery, unspecified surgical

malabsorption, B12 deficiency, and most recently vitamin A deficiency.

 

PLAN:  Orders to be written postoperatively.  To remain n.p.o.

 

 

 

 

Nancy Boo PA-C

DD:  08/31/2020 07:44:20

DT:  08/31/2020 10:40:15

Job #:  [id number]

## 2020-08-31 NOTE — PCM.CONS
H&P History of Present Illness





- General


Date of Service: 08/31/20


Admit Problem/Dx: 


                           Admission Diagnosis/Problem





Admission Diagnosis/Problem      Malnutrition








Source of Information: Patient, Provider, RN Notes Reviewed


History Limitations: Reports: No Limitations





- History of Present Illness


Initial Comments - Free Text/Narative: 





Ms. Bernardo is a 45-year-old woman who I been asked to see by Dr. Lopez for 

recommendations concerning evaluation and management of a bradycardic cardiac 

dysrhythmia.  She was admitted to this facility last week with severe 

malnutrition and underlying small bowel obstruction as well as superior 

mesenteric artery syndrome.  She is received TPN over the past several days and 

reports that she actually has been feeling quite a bit better.  She was taken to

the operating room today for an exploratory laparotomy.  Prior to surgery she 

was placed on cardiac monitoring and noted to be bradycardic with rates into the

30s.  EKG has been obtained and appears to be consistent with sinus node 

dysfunction and junctional escape rhythm.  Because of her malnutrition she had 

become progressively more weak, over the past week strength is improved and she 

has been able to ambulate in the hallways without significant difficulty.  She 

denies any symptoms of chest pain or pressure significant dyspnea, palpitations,

or lightheadedness.  She denies any previous history of cardiac disease.


  ** Left Shoulder


Pain Score (Numeric/FACES): 0





- Related Data


Allergies/Adverse Reactions: 


                                    Allergies











Allergy/AdvReac Type Severity Reaction Status Date / Time


 


No Known Allergies Allergy   Verified 08/26/20 12:03











Home Medications: 


                                    Home Meds





Calcium Carbonate/Vitamin D3 [Calcium 500-Vit D3 200 Caplet] 1 tab PO BID 

08/26/20 [History]


Cyanocobalamin (Vitamin B-12) [Vitamin B-12] 1,000 mcg SL DAILY 08/26/20 

[History]


Multivitamin/Iron/Folic Acid [Centrum Adults Tablet] 1 tab PO DAILY 08/26/20 

[History]


Vitamin B Complex [B Complex] 1 tab PO DAILY 08/26/20 [History]











Past Medical History


HEENT History: Reports: None


Cardiovascular History: Reports: None


Respiratory History: Reports: None


Gastrointestinal History: Reports: Chronic Diarrhea


Genitourinary History: Reports: None


OB/GYN History: Reports: None


Musculoskeletal History: Reports: None


Neurological History: Reports: None


Psychiatric History: Reports: None


Endocrine/Metabolic History: Reports: None


Hematologic History: Reports: None


Immunologic History: Reports: None


Oncologic (Cancer) History: Reports: None


Dermatologic History: Reports: None





- Infectious Disease History


Infectious Disease History: Reports: None





- Past Surgical History


Head Surgeries/Procedures: Reports: None


HEENT Surgical History: Reports: None


Cardiovascular Surgical History: Reports: None


Respiratory Surgical History: Reports: None


GI Surgical History: Reports: Bariatric Procedure, Cholecystectomy, Hernia, 

Abdominal


Female  Surgical History: Reports: None


Endocrine Surgical History: Reports: None


Neurological Surgical History: Reports: None


Musculoskeletal Surgical History: Reports: None


Oncologic Surgical History: Reports: None


Dermatological Surgical History: Reports: None





Social & Family History





- Tobacco Use


Smoking Status *Q: Current Every Day Smoker


Years of Tobacco use: 20


Packs/Tins Daily: 1


Used Tobacco, but Quit: No


Second Hand Smoke Exposure: No





- Caffeine Use


Caffeine Use: Reports: Soda





- Recreational Drug Use


Recreational Drug Use: No





H&P Review of Systems





- Review of Systems:


Review Of Systems: See Below


General: Reports: Weakness, Fatigue, Weight Loss.  Denies: Fever, Chills


Pulmonary: Reports: No Symptoms


Cardiovascular: Reports: No Symptoms


Gastrointestinal: Reports: Diarrhea.  Denies: Abdominal Pain, Difficulty 

Swallowing, Distension, Hematemesis, Hematochezia, Melena, Nausea, Vomiting


Genitourinary: Reports: No Symptoms


Musculoskeletal: Reports: No Symptoms


Skin: Reports: No Symptoms





Exam





- Exam


Exam: See Below





- Vital Signs


Vital Signs: 


                                Last Vital Signs











Temp  95.9 F L  08/31/20 14:34


 


Pulse  66   08/31/20 10:53


 


Resp  16   08/31/20 14:34


 


BP  130/76   08/31/20 14:34


 


Pulse Ox  100   08/31/20 14:34











Weight: 156 lb 9.6 oz





- Exam


Quality Assessment: DVT Prophylaxis


General: Alert, Oriented, Cooperative, Mild Distress


HEENT: Conjunctiva Clear, Hearing Intact, Mucosa Moist & Pink, Normal Nasal 

Septum, Posterior Pharynx Clear, Pupils Equal


Neck: Supple, Trachea Midline, +2 Carotid Pulse wo Bruit


Lungs: Clear to Auscultation, Normal Respiratory Effort


Cardiovascular: Normal S1, Normal S2, Irregular Rhythm, Bradycardia.  No: 

Systolic Murmur, Diastolic Murmur


GI/Abdominal Exam: Soft, Non-Tender, No Organomegaly, No Distention


Back Exam: Normal Inspection, Full Range of Motion


Extremities: Non-Tender, No Pedal Edema





- Patient Data


Lab Results Last 24 hrs: 


                         Laboratory Results - last 24 hr











  08/26/20 08/31/20 08/31/20 Range/Units





  12:40 04:30 04:30 


 


WBC   5.7   (4.5-11.0)  K/uL


 


RBC   3.19 L   (3.30-5.50)  M/uL


 


Hgb   10.0 L   (12.0-15.0)  g/dL


 


Hct   31.4 L   (36.0-48.0)  %


 


MCV   98   (80-98)  fL


 


MCH   31   (27-31)  pg


 


MCHC   32   (32-36)  %


 


Plt Count   214   (150-400)  K/uL


 


Sodium    142  (140-148)  mmol/L


 


Potassium    4.8  (3.6-5.2)  mmol/L


 


Chloride    107  (100-108)  mmol/L


 


Carbon Dioxide    33 H  (21-32)  mmol/L


 


Anion Gap    6.8  (5.0-14.0)  mmol/L


 


BUN    12  (7-18)  mg/dL


 


Creatinine    0.4 L  (0.6-1.0)  mg/dL


 


Est Cr Clr Drug Dosing    168.02  mL/min


 


Estimated GFR (MDRD)    > 60  (>60)  


 


Glucose    83  ()  mg/dL


 


Calcium    7.9 L  (8.5-10.1)  mg/dL


 


Phosphorus    4.3  (2.5-4.9)  mg/dL


 


Magnesium    2.1  (1.8-2.4)  mg/dL


 


Total Bilirubin    0.5  (0.2-1.0)  mg/dL


 


AST    66 H  (15-37)  U/L


 


ALT    82 H  (12-78)  U/L


 


Alkaline Phosphatase    49  ()  U/L


 


NT-Pro-B Natriuret Pep    336 H  (5-125)  pg/mL


 


Total Protein    4.5 L  (6.4-8.2)  g/dL


 


Albumin    2.8 L  (3.4-5.0)  g/dL


 


Globulin    1.7 L  (2.3-3.5)  g/dL


 


Albumin/Globulin Ratio    1.7  (1.2-2.2)  


 


Thiamine (Vit B1) Christina  169.4    (66.5-200.0)  nmol/L











Result Diagrams: 


                                 08/31/20 04:30





                                 08/31/20 04:30





Sepsis Event Note





- Evaluation


Sepsis Screening Result: No Definite Risk





- Focused Exam


Vital Signs: 


                                   Vital Signs











  Temp Pulse Resp BP Pulse Ox


 


 08/31/20 14:34  95.9 F L   16  130/76  100


 


 08/31/20 10:53  95.7 F L  66  16  106/75  95


 


 08/31/20 07:08  96.6 F L  56 L  16  121/71  98


 


 08/31/20 03:58  95.1 F L  45 L  16  109/72  98














*Q Meaningful Use (ADM)





- VTE Risk Assess *Q


Each Risk Factor Represents 1 Point: Age 41 - 59 years


Total Score 1 Point Risk Factors: 1


Each Risk Factor Represents 2 Points: None, Major surgery greater than 45 

minutes


Total Score 2 Point Risk Factors: 2


Each Risk Factor Represents 3 Points: None


Total Score 3 Point Risk Factors: 0


Each Risk Factor Represents 5 Points: None


Total Score 5 Point Risk Factors: 0


Venous Thromboembolism Risk Factor Score *Q: 3





Consult PN Assessment/Plan


Problem List Initiated/Reviewed/Updated: Yes


My Orders Last 24 Hours: 


My Active Orders





08/31/20 14:50


TSH ULTRASENSITIVE [CHEM] Urgent 





09/01/20 08:00


Echo Comp wo Cont [US] Urgent 











Plan: 





ASSESSMENT AND RECOMMENDATIONS





BRADYCARDIA-underlying rhythm appears to be sinus node dysfunction with 

junctional escape beats.  She denies any previous history of cardiac disease and

 really for the most part is asymptomatic.  Potentially may be secondary to her 

underlying malnutrition.  TSH has been ordered as well as echocardiogram.  No 

history of infiltrative disease process or inflammatory disease.


-Hold on surgery pending further cardiac evaluation


-Cardiac monitoring


-TSH


-Echocardiogram


Requesting Provider: RICH


Date Consult Requested: 08/31/20


Reason for Consult: Bradycardia


Patient History Reviewed: Yes

## 2020-09-01 RX ADMIN — LEUCINE, PHENYLALANINE, LYSINE, METHIONINE, ISOLEUCINE, VALINE, HISTIDINE, THREONINE, TRYPTOPHAN, ALANINE, GLYCINE, ARGININE, PROLINE, SERINE, TYROSINE, SODIUM ACETATE, DIBASIC POTASSIUM PHOSPHATE, MAGNESIUM CHLORIDE, SODIUM CHLORIDE, CALCIUM CHLORIDE, DEXTROSE SCH MLS/HR
365; 280; 290; 200; 300; 290; 240; 210; 90; 1035; 515; 575; 340; 250; 20; 340; 261; 51; 59; 33; 15 INJECTION INTRAVENOUS at 19:25

## 2020-09-01 RX ADMIN — LEUCINE, PHENYLALANINE, LYSINE, METHIONINE, ISOLEUCINE, VALINE, HISTIDINE, THREONINE, TRYPTOPHAN, ALANINE, GLYCINE, ARGININE, PROLINE, SERINE, TYROSINE, SODIUM ACETATE, DIBASIC POTASSIUM PHOSPHATE, MAGNESIUM CHLORIDE, SODIUM CHLORIDE, CALCIUM CHLORIDE, DEXTROSE SCH MLS/HR
365; 280; 290; 200; 300; 290; 240; 210; 90; 1035; 515; 575; 340; 250; 20; 340; 261; 51; 59; 33; 15 INJECTION INTRAVENOUS at 06:54

## 2020-09-01 RX ADMIN — I.V. FAT EMULSION SCH MLS/HR: 20 EMULSION INTRAVENOUS at 17:50

## 2020-09-01 RX ADMIN — VITAMIN A PALMITATE SCH UNIT: 15 INJECTION, SOLUTION INTRAMUSCULAR at 08:35

## 2020-09-01 NOTE — PCM.CONSN
- General Info


Date of Service: 09/01/20


Subjective Update: 





Ms. Bernardo has remained stable over the last 24 hours.  Review of telemetry 

monitoring shows that he she has been in sinus rhythm since yesterday afternoon.

 Rates are often into the 40s and 50s, she is entirely asymptomatic.  

Preliminary report from echocardiogram shows excellent left ventricular 

function, normal chamber sizes, and no significant valvular abnormalities.


Functional Status: Reports: Tolerating Diet, Ambulating, Urinating





- Review of Systems


General: Reports: No Symptoms


Pulmonary: Reports: No Symptoms


Cardiovascular: Reports: No Symptoms


Gastrointestinal: Reports: Diarrhea.  Denies: Abdominal Pain, Constipation, 

Difficulty Swallowing, Nausea, Vomiting





- Patient Data


Vitals - Most Recent: 


                                Last Vital Signs











Temp  97.4 F   09/01/20 11:51


 


Pulse  56 L  09/01/20 11:51


 


Resp  16   09/01/20 11:51


 


BP  126/74   09/01/20 11:51


 


Pulse Ox  97   09/01/20 11:51











Weight - Most Recent: 160 lb


I&O - Last 24 Hours: 


                                 Intake & Output











 09/01/20 09/01/20 09/01/20





 06:59 14:59 22:59


 


Intake Total 1751 120 


 


Output Total 700 1000 


 


Balance 1051 -880 











Lab Results Last 24 Hours: 


                         Laboratory Results - last 24 hr











  09/01/20 09/01/20 09/01/20 Range/Units





  04:10 04:10 04:10 


 


WBC  4.7    (4.5-11.0)  K/uL


 


RBC  3.22 L    (3.30-5.50)  M/uL


 


Hgb  10.3 L    (12.0-15.0)  g/dL


 


Hct  32.2 L    (36.0-48.0)  %


 


MCV  100 H    (80-98)  fL


 


MCH  32 H    (27-31)  pg


 


MCHC  32    (32-36)  %


 


Plt Count  201    (150-400)  K/uL


 


Neut % (Auto)  47    (36-66)  %


 


Lymph % (Auto)  47 H    (24-44)  %


 


Mono % (Auto)  5    (2-6)  %


 


Eos % (Auto)  2    (2-4)  %


 


Baso % (Auto)  0    (0-1)  %


 


Sodium   141   (140-148)  mmol/L


 


Potassium   5.6 H   (3.6-5.2)  mmol/L


 


Chloride   106   (100-108)  mmol/L


 


Carbon Dioxide   34 H   (21-32)  mmol/L


 


Anion Gap   6.6   (5.0-14.0)  mmol/L


 


BUN   14   (7-18)  mg/dL


 


Creatinine   0.5 L   (0.6-1.0)  mg/dL


 


Est Cr Clr Drug Dosing   134.41   mL/min


 


Estimated GFR (MDRD)   > 60   (>60)  


 


Glucose   76   ()  mg/dL


 


Calcium   7.9 L   (8.5-10.1)  mg/dL


 


Phosphorus   4.9   (2.5-4.9)  mg/dL


 


Magnesium   2.1   (1.8-2.4)  mg/dL


 


Total Bilirubin   0.5   (0.2-1.0)  mg/dL


 


AST   183 H D   (15-37)  U/L


 


ALT   164 H   (12-78)  U/L


 


Alkaline Phosphatase   64   ()  U/L


 


NT-Pro-B Natriuret Pep    375 H  (5-125)  pg/mL


 


Total Protein   4.5 L   (6.4-8.2)  g/dL


 


Albumin   2.6 L   (3.4-5.0)  g/dL


 


Globulin   1.9 L   (2.3-3.5)  g/dL


 


Albumin/Globulin Ratio   1.4   (1.2-2.2)  











Med Orders - Current: 


                               Current Medications





Acetaminophen (Tylenol)  650 mg PO Q4H PRN


   PRN Reason: PAIN/FEVER


   Last Admin: 08/28/20 04:12 Dose:  650 mg


   Documented by: 


Acetaminophen (Tylenol)  650 mg RECTAL Q4H PRN


   PRN Reason: PAIN/FEVER


Benzocaine/Menthol (Cepacol Sore Throat)  1 lozenge MUCMEM ASDIRECTED PRN


   PRN Reason: Sore Throat


Heparin Sodium (Porcine) (Heparin Lock Flush 100 Units/Ml)  500 units FLUSH 

ASDIRECTED PRN


   PRN Reason: IV Use


   Last Admin: 08/28/20 07:32 Dose:  500 units


   Documented by: 


Fat Emulsion Intravenous (Intralipid 20%)  100 mls @ 10 mls/hr IV DAILY@1800 [facility]


   Last Admin: 08/31/20 17:57 Dose:  10 mls/hr


   Documented by: 


Dextrose/Lactated Ringer's (Dextrose 5%-Lactated Ringers)  1,000 mls @ 40 mls/hr

IV ASDIRECTED [facility]


   Last Admin: 08/31/20 14:30 Dose:  40 mls/hr


   Documented by: 


Multivitamins/Minerals 10 ml/Chromium/Copper/Manganese/Seleni/Zn 1 ml/ Amino 

Ac/Electrol/Dextrose/Calcium  1,011 mls @ 82 mls/hr IV .BY DURATION [facility]


Amino Ac/Electrol/Dextrose/Calcium (Clinimix E 5/15)  1,000 mls @ 82 mls/hr IV 

.BY DURATION [facility]


Albumin Human (Albumin 25%)  25 gm in 100 mls @ 25 mls/hr IV DAILY [facility]


   Stop: 09/03/20 12:59


   Last Admin: 09/01/20 08:35 Dose:  25 mls/hr


   Documented by: 


Pantoprazole Sodium (Protonix***)  40 mg PO ACBREAKFAST [facility]


   Last Admin: 09/01/20 08:35 Dose:  40 mg


   Documented by: 





Discontinued Medications





Bupivacaine HCl (Marcaine 0.5%) Confirm Administered Dose 50 ml .ROUTE .STK-MED 

ONE


   Stop: 08/27/20 06:39


   Last Admin: 08/27/20 11:02 Dose:  5 ml


   Documented by: 


Bupivacaine HCl (Marcaine 0.5%) Confirm Administered Dose 50 ml .ROUTE .STK-MED 

ONE


   Stop: 08/31/20 07:03


Bupivacaine HCl (Marcaine 0.5%) Confirm Administered Dose 50 ml .ROUTE .STK-MED 

ONE


   Stop: 08/31/20 07:04


Cefazolin Sodium (Ancef) Confirm Administered Dose 1 gm .ROUTE .STK-MED ONE


   Stop: 08/27/20 11:15


Ropivacaine 35 ml/Dexamethasone 8 mg/Epinephrine HCl 0.4 mg/ Sodium Chloride 42

.6 ml  0 ml NERVRT ASDIRECTED [facility]


   Last Admin: 08/31/20 13:20 Dose:  Not Given


   Documented by: 


Dexamethasone (Dexamethasone) Confirm Administered Dose 4 mg .ROUTE .STK-MED ONE


   Stop: 08/31/20 08:51


Fentanyl (Sublimaze) Confirm Administered Dose 100 mcg .ROUTE .STK-MED ONE


   Stop: 08/27/20 10:10


Fentanyl (Sublimaze) Confirm Administered Dose 250 mcg .ROUTE .STK-MED ONE


   Stop: 08/31/20 13:13


Furosemide (Lasix)  20 mg IVPUSH ONETIME ONE


   Stop: 08/30/20 14:01


   Last Admin: 08/30/20 14:38 Dose:  20 mg


   Documented by: 


Glycopyrrolate (Robinul) Confirm Administered Dose 1 mg .ROUTE .STK-MED ONE


   Stop: 08/31/20 08:51


Heparin Sodium (Porcine) (Heparin Lock Flush 100 Units/Ml) Confirm Administered 

Dose 1,500 units .ROUTE .STK-MED ONE


   Stop: 08/27/20 06:39


   Last Admin: 08/27/20 11:03 Dose:  1,000 units


   Documented by: 


Dextrose/Lactated Ringer's (Dextrose 5%-Lactated Ringers)  1,000 mls @ 100 

mls/hr IV ASDIRECTED [facility]


   Stop: 08/27/20 18:00


   Last Admin: 08/27/20 14:58 Dose:  100 mls/hr


   Documented by: 


Multivitamins/Minerals 10 ml/Thiamine HCl 100 mg/ Magnesium Sulfate 2 gm/ Folic 

Acid 1 mg / Lactated Ringer's  1,015.2 mls @ 500 mls/hr IV ONETIME ONE


   Stop: 08/26/20 18:01


   Last Admin: 08/26/20 16:24 Dose:  500 mls/hr


   Documented by: 


Potassium Chloride 20 meq/Lidocaine HCl 2 ml/ Sodium Chloride  112 mls @ 56 

mls/hr IV Q2H [facility]


   Stop: 08/26/20 19:59


   Last Admin: 08/26/20 18:35 Dose:  56 mls/hr


   Documented by: 


Sodium Chloride (Normal Saline)  70 mls @ 3 mls/sec IV ASDIRECTED [facility]


   Stop: 08/26/20 14:31


   Last Admin: 08/26/20 14:39 Dose:  3 mls/sec


   Documented by: 


Albumin Human (Albumin 25%)  25 gm in 100 mls @ 25 mls/hr IV Q24H [facility]


   Stop: 08/30/20 13:59


   Last Admin: 08/30/20 12:37 Dose:  25 mls/hr


   Documented by: 


Albumin Human (Albumin 25%)  25 gm in 100 mls @ 25 mls/hr IV Q24H [facility]


   Stop: 08/30/20 17:59


   Last Admin: 08/30/20 16:01 Dose:  25 mls/hr


   Documented by: 


Sodium Chloride (Normal Saline) Confirm Administered Dose 10 mls @ as directed 

.ROUTE .STK-MED ONE


   Stop: 08/27/20 11:15


Multivitamins/Minerals 10 ml/Chromium/Copper/Manganese/Seleni/Zn 1 ml/ Amino 

Ac/Electrol/Dextrose/Calcium  1,011 mls @ 100 mls/hr IV .BY DURATION [facility]


   Last Admin: 08/27/20 17:50 Dose:  100 mls/hr


   Documented by: 


Amino Ac/Electrol/Dextrose/Calcium (Clinimix E 5/15)  1,000 mls @ 100 mls/hr IV 

.BY DURATION [facility]


   Last Admin: 08/28/20 10:16 Dose:  Not Given


   Documented by: 


Multivitamins/Minerals 10 ml/Chromium/Copper/Manganese/Seleni/Zn 1 ml/ Amino 

Ac/Electrol/Dextrose/Calcium  1,011 mls @ 100 mls/hr IV .BY DURATION [facility]


   Stop: 08/29/20 15:00


   Last Admin: 08/29/20 07:21 Dose:  100 mls/hr


   Documented by: 


Amino Ac/Electrol/Dextrose/Calcium (Clinimix E 5/15)  1,000 mls @ 100 mls/hr IV 

.BY DURATION [facility]


   Stop: 08/29/20 15:00


   Last Admin: 08/28/20 20:28 Dose:  100 mls/hr


   Documented by: 


Amino Ac/Electrol/Dextrose/Calcium (Clinimix E 5/15)  1,000 mls @ 100 mls/hr IV 

.Q10H [facility]


   Last Admin: 09/01/20 06:54 Dose:  100 mls/hr


   Documented by: 


Cefoxitin Sodium 2 gm/ Sodium (Chloride)  50 mls @ 100 mls/hr IV ONCALL ONE


   Stop: 08/31/20 10:59


   Last Admin: 08/31/20 13:15 Dose:  100 mls/hr


   Documented by: 


Lactated Ringer's (Ringers, Lactated) Confirm Administered Dose 1,000 mls @ as 

directed .ROUTE .STK-MED ONE


   Stop: 08/31/20 13:50


Iopamidol (Isovue-300 (61%))  50 ml PO ASDIRECTED ONE


   Stop: 08/26/20 12:44


   Last Admin: 08/26/20 13:02 Dose:  50 ml


   Documented by: 


Iopamidol (Isovue-300 (61%))  100 ml IV .AS DIRECTED [facility]


   Stop: 08/26/20 14:31


   Last Admin: 08/26/20 14:39 Dose:  100 ml


   Documented by: 


Lidocaine/Epinephrine (Xylocaine 1% With Epinephrine 1:100,000) Confirm 

Administered Dose 50 ml .ROUTE .STK-MED ONE


   Stop: 08/27/20 06:40


   Last Admin: 08/27/20 11:02 Dose:  5 ml


   Documented by: 


Lidocaine/Epinephrine (Xylocaine 1% With Epinephrine 1:100,000) Confirm 

Administered Dose 50 ml .ROUTE .STK-MED ONE


   Stop: 08/31/20 07:04


Meropenem (Merrem) Confirm Administered Dose 500 mg .ROUTE .STK-MED ONE


   Stop: 08/31/20 07:03


Meropenem (Merrem) Confirm Administered Dose 500 mg .ROUTE .STK-MED ONE


   Stop: 08/31/20 08:58


Midazolam HCl (Versed 1 Mg/Ml) Confirm Administered Dose 2 mg .ROUTE .STK-MED 

ONE


   Stop: 08/27/20 10:10


Neostigmine Methylsulfate (Neostigmine) Confirm Administered Dose 5 mg .ROUTE 

.STK-MED ONE


   Stop: 08/31/20 08:51


Non-Formulary Medication (Total Parenteral Nutrition, Central)  1,000 ml .XX 

.Continue Order [facility]


   Stop: 08/28/20 18:00


Ondansetron HCl (Zofran) Confirm Administered Dose 4 mg .ROUTE .STK-MED ONE


   Stop: 08/31/20 08:51


Pantoprazole Sodium (Protonix Iv***)  40 mg IV Q24H [facility]


   Last Admin: 08/27/20 15:00 Dose:  40 mg


   Documented by: 


Propofol (Diprivan  20 Ml) Confirm Administered Dose 200 mg .ROUTE .STK-MED ONE


   Stop: 08/27/20 10:10


Propofol (Diprivan  20 Ml) Confirm Administered Dose 200 mg .ROUTE .STK-MED ONE


   Stop: 08/27/20 11:14


Sodium Chloride (Saline Flush)  10 ml FLUSH ONETIME ONE


   Stop: 08/26/20 14:18


   Last Admin: 08/26/20 14:38 Dose:  10 ml


   Documented by: 


Vitamin A Palmitate (Aquasol A)  100,000 unit IM DAILY [facility]


   Stop: 09/01/20 09:01


   Last Admin: 09/01/20 08:35 Dose:  100,000 unit


   Documented by: 











- Exam


General: Alert, Oriented, Cooperative, No Acute Distress


Lungs: Clear to Auscultation, Normal Respiratory Effort


Cardiovascular: Regular Rhythm, No Murmurs, Bradycardia


GI/Abdominal Exam: Soft, Non-Tender, No Organomegaly, No Distention


Extremities: Non-Tender, No Pedal Edema





Sepsis Event Note





- Evaluation


Sepsis Screening Result: No Definite Risk





- Focused Exam


Vital Signs: 


                                   Vital Signs











  Temp Pulse Pulse Resp BP Pulse Ox


 


 09/01/20 11:51  97.4 F   56 L  16  126/74  97


 


 09/01/20 09:06  96.6 F L  60   16  101/58 L  98














Consult PN Assessment/Plan


Problem List Initiated/Reviewed/Updated: Yes


My Orders Last 24 Hours: 


My Active Orders





09/01/20 08:00


Echo Comp wo Cont [US] Urgent 











Plan: 





ASSESSMENT AND RECOMMENDATIONS





BRADYCARDIA-underlying rhythm appears to be sinus node dysfunction with 

junctional escape beats.  TSH was found to be normal and echocardiogram, 

preliminary report, appears to be normal as well.  Reviewed with EP cardiology, 

dysrhythmia likely caused by a vagal episode versus medication.  Plan to proceed

 with surgery as scheduled for Thursday, September 3.


-Cardiac monitoring

## 2020-09-02 RX ADMIN — LEUCINE, PHENYLALANINE, LYSINE, METHIONINE, ISOLEUCINE, VALINE, HISTIDINE, THREONINE, TRYPTOPHAN, ALANINE, GLYCINE, ARGININE, PROLINE, SERINE, TYROSINE, SODIUM ACETATE, DIBASIC POTASSIUM PHOSPHATE, MAGNESIUM CHLORIDE, SODIUM CHLORIDE, CALCIUM CHLORIDE, DEXTROSE SCH MLS/HR
365; 280; 290; 200; 300; 290; 240; 210; 90; 1035; 515; 575; 340; 250; 20; 340; 261; 51; 59; 33; 15 INJECTION INTRAVENOUS at 20:29

## 2020-09-02 RX ADMIN — LEUCINE, PHENYLALANINE, LYSINE, METHIONINE, ISOLEUCINE, VALINE, HISTIDINE, THREONINE, TRYPTOPHAN, ALANINE, GLYCINE, ARGININE, PROLINE, SERINE, TYROSINE, SODIUM ACETATE, DIBASIC POTASSIUM PHOSPHATE, MAGNESIUM CHLORIDE, SODIUM CHLORIDE, CALCIUM CHLORIDE, DEXTROSE SCH MLS/HR
365; 280; 290; 200; 300; 290; 240; 210; 90; 1035; 515; 575; 340; 250; 20; 340; 261; 51; 59; 33; 15 INJECTION INTRAVENOUS at 07:58

## 2020-09-02 RX ADMIN — I.V. FAT EMULSION SCH MLS/HR: 20 EMULSION INTRAVENOUS at 17:00

## 2020-09-02 NOTE — PCM.CONSN
- General Info


Date of Service: 09/02/20


Subjective Update: 





Ms. Bernardo has remained stable over the last 24 hours.  She has remained in sinus

rhythm, often bradycardic.  No further evidence of significant bradycardia or 

junctional rhythm.


Functional Status: Reports: Ambulating, Urinating





- Review of Systems


General: Reports: Weakness.  Denies: Fever, Chills


Pulmonary: Reports: No Symptoms


Cardiovascular: Reports: No Symptoms


Gastrointestinal: Reports: No Symptoms





- Patient Data


Vitals - Most Recent: 


                                Last Vital Signs











Temp  97.0 F   09/02/20 14:22


 


Pulse  60   09/02/20 14:22


 


Resp  16   09/02/20 14:22


 


BP  108/67   09/02/20 14:22


 


Pulse Ox  97   09/02/20 14:22











Weight - Most Recent: 156 lb 12.8 oz


I&O - Last 24 Hours: 


                                 Intake & Output











 09/02/20 09/02/20 09/02/20





 06:59 14:59 22:59


 


Intake Total 899 660 


 


Output Total 700 2400 


 


Balance 199 -1740 











Lab Results Last 24 Hours: 


                         Laboratory Results - last 24 hr











  09/02/20 09/02/20 Range/Units





  04:30 04:30 


 


WBC  4.8   (4.5-11.0)  K/uL


 


RBC  3.18 L   (3.30-5.50)  M/uL


 


Hgb  10.2 L   (12.0-15.0)  g/dL


 


Hct  32.4 L   (36.0-48.0)  %


 


MCV  102 H   (80-98)  fL


 


MCH  32 H   (27-31)  pg


 


MCHC  32   (32-36)  %


 


Plt Count  207   (150-400)  K/uL


 


Sodium   143  (140-148)  mmol/L


 


Potassium   4.9  (3.6-5.2)  mmol/L


 


Chloride   107  (100-108)  mmol/L


 


Carbon Dioxide   35 H  (21-32)  mmol/L


 


Anion Gap   5.9  (5.0-14.0)  mmol/L


 


BUN   16  (7-18)  mg/dL


 


Creatinine   0.6  (0.6-1.0)  mg/dL


 


Est Cr Clr Drug Dosing   112.01  mL/min


 


Estimated GFR (MDRD)   > 60  (>60)  


 


Glucose   81  ()  mg/dL


 


Calcium   8.3 L  (8.5-10.1)  mg/dL


 


Phosphorus   5.0 H  (2.5-4.9)  mg/dL


 


Magnesium   2.4  (1.8-2.4)  mg/dL


 


Total Bilirubin   0.5  (0.2-1.0)  mg/dL


 


AST   89 H  (15-37)  U/L


 


ALT   148 H  (12-78)  U/L


 


Alkaline Phosphatase   70  ()  U/L


 


NT-Pro-B Natriuret Pep   208 H  (5-125)  pg/mL


 


Total Protein   4.5 L  (6.4-8.2)  g/dL


 


Albumin   2.8 L  (3.4-5.0)  g/dL


 


Globulin   1.7 L  (2.3-3.5)  g/dL


 


Albumin/Globulin Ratio   1.7  (1.2-2.2)  











Med Orders - Current: 


                               Current Medications





Acetaminophen (Tylenol)  650 mg PO Q4H PRN


   PRN Reason: PAIN/FEVER


   Last Admin: 08/28/20 04:12 Dose:  650 mg


   Documented by: 


Acetaminophen (Tylenol)  650 mg RECTAL Q4H PRN


   PRN Reason: PAIN/FEVER


Benzocaine/Menthol (Cepacol Sore Throat)  1 lozenge MUCMEM ASDIRECTED PRN


   PRN Reason: Sore Throat


Ropivacaine 36 ml/Dexamethasone 8 mg/Epinephrine HCl 0.4 mg/ Sodium Chloride 

41.6 ml  0 ml NERVRT ASDIRECTED [facility]


Heparin Sodium (Porcine) (Heparin Lock Flush 100 Units/Ml)  500 units FLUSH 

ASDIRECTED PRN


   PRN Reason: IV Use


   Last Admin: 08/28/20 07:32 Dose:  500 units


   Documented by: 


Fat Emulsion Intravenous (Intralipid 20%)  100 mls @ 10 mls/hr IV DAILY@1800 [facility]


   Last Admin: 09/01/20 17:50 Dose:  10 mls/hr


   Documented by: 


Dextrose/Lactated Ringer's (Dextrose 5%-Lactated Ringers)  1,000 mls @ 40 mls/hr

IV ASDIRECTED [facility]


   Last Admin: 09/01/20 15:39 Dose:  40 mls/hr


   Documented by: 


Multivitamins/Minerals 10 ml/Chromium/Copper/Manganese/Seleni/Zn 1 ml/ Amino 

Ac/Electrol/Dextrose/Calcium  1,011 mls @ 82 mls/hr IV .BY DURATION [facility]


   Last Admin: 09/01/20 19:25 Dose:  82 mls/hr


   Documented by: 


Amino Ac/Electrol/Dextrose/Calcium (Clinimix E 5/15)  1,000 mls @ 82 mls/hr IV 

.BY DURATION [facility]


   Last Admin: 09/02/20 07:58 Dose:  82 mls/hr


   Documented by: 


Albumin Human (Albumin 25%)  25 gm in 100 mls @ 25 mls/hr IV DAILY [facility]


   Stop: 09/03/20 12:59


   Last Admin: 09/02/20 08:06 Dose:  25 mls/hr


   Documented by: 


Cefoxitin Sodium 2 gm/ Sodium (Chloride)  50 mls @ 100 mls/hr IV ONETIME ONE


   Stop: 09/03/20 08:29


Ketamine HCl 50 mg/ Sodium (Chloride)  50 mls @ 18 mls/hr IV ASDIRECTED [facility]


Magnesium Sulfate 2 gm/ Sodium (Chloride)  104 mls @ 208 mls/hr IV ASDIRECTED 

[facility]


Magnesium Sulfate 3.5 gm/ (Sodium Chloride)  257 mls @ 32.125 mls/hr IV ONETIME 

ONE


   Stop: 09/03/20 15:59


Ketamine HCl (Ketalar)  30 mg IV ASDIRECTED [facility]


Pantoprazole Sodium (Protonix***)  40 mg PO ACBREAKFAST [facility]


   Last Admin: 09/02/20 08:05 Dose:  40 mg


   Documented by: 





Discontinued Medications





Bupivacaine HCl (Marcaine 0.5%) Confirm Administered Dose 50 ml .ROUTE .STK-MED 

ONE


   Stop: 08/27/20 06:39


   Last Admin: 08/27/20 11:02 Dose:  5 ml


   Documented by: 


Bupivacaine HCl (Marcaine 0.5%) Confirm Administered Dose 50 ml .ROUTE .STK-MED 

ONE


   Stop: 08/31/20 07:03


Bupivacaine HCl (Marcaine 0.5%) Confirm Administered Dose 50 ml .ROUTE .STK-MED 

ONE


   Stop: 08/31/20 07:04


Cefazolin Sodium (Ancef) Confirm Administered Dose 1 gm .ROUTE .STK-MED ONE


   Stop: 08/27/20 11:15


Ropivacaine 35 ml/Dexamethasone 8 mg/Epinephrine HCl 0.4 mg/ Sodium Chloride 

42.6 ml  0 ml NERVRT ASDIRECTED [facility]


   Last Admin: 08/31/20 13:20 Dose:  Not Given


   Documented by: 


Dexamethasone (Dexamethasone) Confirm Administered Dose 4 mg .ROUTE .STK-MED ONE


   Stop: 08/31/20 08:51


Fentanyl (Sublimaze) Confirm Administered Dose 100 mcg .ROUTE .STK-MED ONE


   Stop: 08/27/20 10:10


Fentanyl (Sublimaze) Confirm Administered Dose 250 mcg .ROUTE .K-MED ONE


   Stop: 08/31/20 13:13


Furosemide (Lasix)  20 mg IVPUSH ONETIME ONE


   Stop: 08/30/20 14:01


   Last Admin: 08/30/20 14:38 Dose:  20 mg


   Documented by: 


Glycopyrrolate (Robinul) Confirm Administered Dose 1 mg .ROUTE .STK-MED ONE


   Stop: 08/31/20 08:51


Heparin Sodium (Porcine) (Heparin Lock Flush 100 Units/Ml) Confirm Administered 

Dose 1,500 units .ROUTE .[facility]-MED ONE


   Stop: 08/27/20 06:39


   Last Admin: 08/27/20 11:03 Dose:  1,000 units


   Documented by: 


Dextrose/Lactated Ringer's (Dextrose 5%-Lactated Ringers)  1,000 mls @ 100 

mls/hr IV ASDIRECTED [facility]


   Stop: 08/27/20 18:00


   Last Admin: 08/27/20 14:58 Dose:  100 mls/hr


   Documented by: 


Multivitamins/Minerals 10 ml/Thiamine HCl 100 mg/ Magnesium Sulfate 2 gm/ Folic 

Acid 1 mg / Lactated Ringer's  1,015.2 mls @ 500 mls/hr IV ONETIME ONE


   Stop: 08/26/20 18:01


   Last Admin: 08/26/20 16:24 Dose:  500 mls/hr


   Documented by: 


Potassium Chloride 20 meq/Lidocaine HCl 2 ml/ Sodium Chloride  112 mls @ 56 

mls/hr IV Q2H [facility]


   Stop: 08/26/20 19:59


   Last Admin: 08/26/20 18:35 Dose:  56 mls/hr


   Documented by: 


Sodium Chloride (Normal Saline)  70 mls @ 3 mls/sec IV ASDIRECTED [facility]


   Stop: 08/26/20 14:31


   Last Admin: 08/26/20 14:39 Dose:  3 mls/sec


   Documented by: 


Albumin Human (Albumin 25%)  25 gm in 100 mls @ 25 mls/hr IV Q24H [facility]


   Stop: 08/30/20 13:59


   Last Admin: 08/30/20 12:37 Dose:  25 mls/hr


   Documented by: 


Albumin Human (Albumin 25%)  25 gm in 100 mls @ 25 mls/hr IV Q24H [facility]


   Stop: 08/30/20 17:59


   Last Admin: 08/30/20 16:01 Dose:  25 mls/hr


   Documented by: 


Sodium Chloride (Normal Saline) Confirm Administered Dose 10 mls @ as directed 

.ROUTE .STK-MED ONE


   Stop: 08/27/20 11:15


Multivitamins/Minerals 10 ml/Chromium/Copper/Manganese/Seleni/Zn 1 ml/ Amino 

Ac/Electrol/Dextrose/Calcium  1,011 mls @ 100 mls/hr IV .BY DURATION [facility]


   Last Admin: 08/27/20 17:50 Dose:  100 mls/hr


   Documented by: 


Amino Ac/Electrol/Dextrose/Calcium (Clinimix E 5/15)  1,000 mls @ 100 mls/hr IV 

.BY DURATION [facility]


   Last Admin: 08/28/20 10:16 Dose:  Not Given


   Documented by: 


Multivitamins/Minerals 10 ml/Chromium/Copper/Manganese/Seleni/Zn 1 ml/ Amino 

Ac/Electrol/Dextrose/Calcium  1,011 mls @ 100 mls/hr IV .BY DURATION [facility]


   Stop: 08/29/20 15:00


   Last Admin: 08/29/20 07:21 Dose:  100 mls/hr


   Documented by: 


Amino Ac/Electrol/Dextrose/Calcium (Clinimix E 5/15)  1,000 mls @ 100 mls/hr IV 

.BY DURATION [facility]


   Stop: 08/29/20 15:00


   Last Admin: 08/28/20 20:28 Dose:  100 mls/hr


   Documented by: 


Amino Ac/Electrol/Dextrose/Calcium (Clinimix E 5/15)  1,000 mls @ 100 mls/hr IV 

.Q10H [facility]


   Last Admin: 09/01/20 06:54 Dose:  100 mls/hr


   Documented by: 


Cefoxitin Sodium 2 gm/ Sodium (Chloride)  50 mls @ 100 mls/hr IV ONCALL ONE


   Stop: 08/31/20 10:59


   Last Admin: 08/31/20 13:15 Dose:  100 mls/hr


   Documented by: 


Lactated Ringer's (Ringers, Lactated) Confirm Administered Dose 1,000 mls @ as 

directed .ROUTE .STK-MED ONE


   Stop: 08/31/20 13:50


Iopamidol (Isovue-300 (61%))  50 ml PO ASDIRECTED ONE


   Stop: 08/26/20 12:44


   Last Admin: 08/26/20 13:02 Dose:  50 ml


   Documented by: 


Iopamidol (Isovue-300 (61%))  100 ml IV .AS DIRECTED DIANE


   Stop: 08/26/20 14:31


   Last Admin: 08/26/20 14:39 Dose:  100 ml


   Documented by: 


Lidocaine/Epinephrine (Xylocaine 1% With Epinephrine 1:100,000) Confirm 

Administered Dose 50 ml .ROUTE .STK-MED ONE


   Stop: 08/27/20 06:40


   Last Admin: 08/27/20 11:02 Dose:  5 ml


   Documented by: 


Lidocaine/Epinephrine (Xylocaine 1% With Epinephrine 1:100,000) Confirm 

Administered Dose 50 ml .ROUTE .STK-MED ONE


   Stop: 08/31/20 07:04


Meropenem (Merrem) Confirm Administered Dose 500 mg .ROUTE .STK-MED ONE


   Stop: 08/31/20 07:03


Meropenem (Merrem) Confirm Administered Dose 500 mg .ROUTE .STK-MED ONE


   Stop: 08/31/20 08:58


Midazolam HCl (Versed 1 Mg/Ml) Confirm Administered Dose 2 mg .ROUTE .STK-MED 

ONE


   Stop: 08/27/20 10:10


Neostigmine Methylsulfate (Neostigmine) Confirm Administered Dose 5 mg .ROUTE 

.STK-MED ONE


   Stop: 08/31/20 08:51


Non-Formulary Medication (Total Parenteral Nutrition, Central)  1,000 ml .XX 

.Continue Order [facility]


   Stop: 08/28/20 18:00


Non-Formulary Medication (Total Parenteral Nutrition, Central)  1,000 ml .XX 

.Continue Order [facility]


   Stop: 09/02/20 14:00


Ondansetron HCl (Zofran) Confirm Administered Dose 4 mg .ROUTE .STK-MED ONE


   Stop: 08/31/20 08:51


Pantoprazole Sodium (Protonix Iv***)  40 mg IV Q24H [facility]


   Last Admin: 08/27/20 15:00 Dose:  40 mg


   Documented by: 


Propofol (Diprivan  20 Ml) Confirm Administered Dose 200 mg .ROUTE .STK-MED ONE


   Stop: 08/27/20 10:10


Propofol (Diprivan  20 Ml) Confirm Administered Dose 200 mg .ROUTE .STK-MED ONE


   Stop: 08/27/20 11:14


Sodium Chloride (Saline Flush)  10 ml FLUSH ONETIME ONE


   Stop: 08/26/20 14:18


   Last Admin: 08/26/20 14:38 Dose:  10 ml


   Documented by: 


Vitamin A Palmitate (Aquasol A)  100,000 unit IM DAILY DIANE


   Stop: 09/01/20 09:01


   Last Admin: 09/01/20 08:35 Dose:  100,000 unit


   Documented by: 











- Exam


General: Alert, Oriented, Cooperative, No Acute Distress


Lungs: Clear to Auscultation, Normal Respiratory Effort


Cardiovascular: Regular Rhythm, No Murmurs, Bradycardia


GI/Abdominal Exam: Soft, Non-Tender, No Organomegaly, No Distention


Extremities: Non-Tender, No Pedal Edema





Sepsis Event Note





- Evaluation


Sepsis Screening Result: No Definite Risk





- Focused Exam


Vital Signs: 


                                   Vital Signs











  Temp Pulse Resp BP Pulse Ox


 


 09/02/20 14:22  97.0 F  60  16  108/67  97


 


 09/02/20 10:50  96.5 F L  63  16  104/69  99


 


 09/02/20 06:53  97.3 F  52 L  16  108/61  97














Consult PN Assessment/Plan


Problem List Initiated/Reviewed/Updated: Yes


Plan: 





ASSESSMENT AND RECOMMENDATIONS





BRADYCARDIA-she has remained in sinus rhythm, often bradycardic.  No further 

evidence of junctional rhythm or severe bradycardia.


-Cardiac monitoring

## 2020-09-02 NOTE — PN
DATE OF SERVICE:  09/02/2020

 

SUBJECTIVE:  Rojelio's surgery was canceled due to cardiac arrhythmia.  She did have a

hospitalist consult, and she will be scheduled for surgery tomorrow.  She continues to have

TPN running.  Vital signs have been stable.  Oral intake 1120. Urine output is 2400.  States

she is having bowel movements every time she eats.

 

REVIEW OF SYSTEMS:  Remainder of review of systems negative for any pertinent positives and

negatives.

 

OBJECTIVE:  GENERAL:  Rojelio is a pleasant 45-year-old female.  She is alert and

orientated.

VITAL SIGNS:  TPR is 97.3, 52, 16, blood pressure is 108/61.

HEENT: Negative.

NECK: Supple.

HEART: Regular rate and rhythm.

LUNGS: Clear.

ABDOMEN: Soft, nontender.

EXTREMITIES: Without peripheral edema.

 

ASSESSMENT:

1. Severe protein malnutrition.

2. Low albumin.

3. Iron deficiency anemia.

4. Diarrhea.

5. Status post Ranjeet-en-Y gastric bypass surgery.

6. Unspecified surgical malabsorption.

7. B12 deficiency.

8. Vitamin A deficiency.

 

PLAN:  Schedule and have consent signed for exploratory laparotomy with release of small

bowel obstruction, possible small bowel resection, formation of duodenojejunostomy for

treatment of superior mesenteric artery syndrome, general anesthesia, tap block, ketamine

loading dose and infusion, magnesium loading dose and infusion.  Case to follow, 09/03/2020.

Surgeon:  Terence Lopez MD. Cefoxitin 2 g IV on-call to OR and n.p.o. after midnight.

Continue same TPN rate and content. Check CBC, CMP, mag, phos in a.m.

 

 

 

 

Nancy Boo PA-C

DD:  09/02/2020 09:10:13

DT:  09/02/2020 11:43:20

Job #:  [id number]

## 2020-09-03 PROCEDURE — 0DB80ZZ EXCISION OF SMALL INTESTINE, OPEN APPROACH: ICD-10-PCS | Performed by: SURGERY

## 2020-09-03 PROCEDURE — 0DQ80ZZ REPAIR SMALL INTESTINE, OPEN APPROACH: ICD-10-PCS | Performed by: SURGERY

## 2020-09-03 PROCEDURE — 3E0M05Z INTRODUCTION OF ADHESION BARRIER INTO PERITONEAL CAVITY, OPEN APPROACH: ICD-10-PCS | Performed by: SURGERY

## 2020-09-03 PROCEDURE — 0D160ZA BYPASS STOMACH TO JEJUNUM, OPEN APPROACH: ICD-10-PCS | Performed by: SURGERY

## 2020-09-03 PROCEDURE — 0DS80ZZ REPOSITION SMALL INTESTINE, OPEN APPROACH: ICD-10-PCS | Performed by: SURGERY

## 2020-09-03 PROCEDURE — 0DN90ZZ RELEASE DUODENUM, OPEN APPROACH: ICD-10-PCS | Performed by: SURGERY

## 2020-09-03 RX ADMIN — SODIUM CHLORIDE SCH UNITS: 0.9 INJECTION, SOLUTION INTRAVENOUS at 19:46

## 2020-09-03 RX ADMIN — I.V. FAT EMULSION SCH MLS/HR: 20 EMULSION INTRAVENOUS at 17:08

## 2020-09-03 RX ADMIN — LEUCINE, PHENYLALANINE, LYSINE, METHIONINE, ISOLEUCINE, VALINE, HISTIDINE, THREONINE, TRYPTOPHAN, ALANINE, GLYCINE, ARGININE, PROLINE, SERINE, TYROSINE, SODIUM ACETATE, DIBASIC POTASSIUM PHOSPHATE, MAGNESIUM CHLORIDE, SODIUM CHLORIDE, CALCIUM CHLORIDE, DEXTROSE SCH MLS/HR
365; 280; 290; 200; 300; 290; 240; 210; 90; 1035; 515; 575; 340; 250; 20; 340; 261; 51; 59; 33; 15 INJECTION INTRAVENOUS at 08:29

## 2020-09-03 RX ADMIN — LEUCINE, PHENYLALANINE, LYSINE, METHIONINE, ISOLEUCINE, VALINE, HISTIDINE, THREONINE, TRYPTOPHAN, ALANINE, GLYCINE, ARGININE, PROLINE, SERINE, TYROSINE, SODIUM ACETATE, DIBASIC POTASSIUM PHOSPHATE, MAGNESIUM CHLORIDE, SODIUM CHLORIDE, CALCIUM CHLORIDE, DEXTROSE SCH MLS/HR
365; 280; 290; 200; 300; 290; 240; 210; 90; 1035; 515; 575; 340; 250; 20; 340; 261; 51; 59; 33; 15 INJECTION INTRAVENOUS at 20:35

## 2020-09-03 NOTE — PN
DATE OF SERVICE:  09/03/2020

 

SUBJECTIVE:  Rojelio is n.p.o.  She will be having surgery today.  TPN has been running

without difficulty.  Labs were reviewed.  Vital signs have been stable.

 

REVIEW OF SYSTEMS:  Remainder of review of systems negative for any pertinent positives and

negatives.

 

OBJECTIVE:  GENERAL:  Rojelio Bernardo is a pleasant 45-year-old female.  She is alert and

orientated.  Color pale.

VITAL SIGNS:  TPR at 0700 is 95.6, 47, 16, and blood pressure 102/62.

HEENT:  Negative.

NECK:  Supple.

HEART:  Regular rate and rhythm.

LUNGS:  Clear.

ABDOMEN:  Remains to have generalized tenderness in all 4 quadrants.

EXTREMITIES:  Without peripheral edema.

 

ASSESSMENT:

1. 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

2. Cardiac irregularity.

3. Resolved severe protein malnutrition.

4. Low albumin.

5. Iron-deficiency anemia.

6. Diarrhea.

7. Status post Ranjeet-en-Y gastric bypass surgery.

8. Unspecified surgical malabsorption.

9. B12 deficiency.

10.Vitamin A deficiency.

 

PLAN:  Continue same TPN rate and content.  Check CBC, CMP, mag, phos in a.m.  Remain n.p.o.

Orders will be written postoperatively.

 

 

 

 

Nancy Boo PA-C

DD:  09/03/2020 08:17:46

DT:  09/03/2020 08:52:59

Job #:  [id number]

## 2020-09-03 NOTE — PCM.CONSN
- General Info


Date of Service: 09/03/20


Subjective Update: 





Ms. Bernardo has remained stable over the last 24 hours.  Cardiac rhythm has been 

sinus with rates in the 40s and 50s, currently asymptomatic.


Functional Status: Reports: Tolerating Diet, Ambulating, Urinating





- Review of Systems


General: Denies: Fever, Chills


Pulmonary: Reports: No Symptoms


Cardiovascular: Reports: No Symptoms


Gastrointestinal: Reports: No Symptoms





- Patient Data


Vitals - Most Recent: 


                                Last Vital Signs











Temp  95.6 F L  09/03/20 07:00


 


Pulse  47 L  09/03/20 07:00


 


Resp  16   09/03/20 07:00


 


BP  102/62   09/03/20 07:00


 


Pulse Ox  96   09/03/20 07:28











Weight - Most Recent: 156 lb 12.8 oz


I&O - Last 24 Hours: 


                                 Intake & Output











 09/02/20 09/03/20 09/03/20





 22:59 06:59 14:59


 


Intake Total 2670 2921 100


 


Output Total 2000 2000 


 


Balance 670 921 100











Lab Results Last 24 Hours: 


                         Laboratory Results - last 24 hr











  08/30/20 09/03/20 09/03/20 Range/Units





  04:10 04:00 04:00 


 


WBC   4.5   (4.5-11.0)  K/uL


 


RBC   3.07 L   (3.30-5.50)  M/uL


 


Hgb   9.9 L   (12.0-15.0)  g/dL


 


Hct   31.2 L   (36.0-48.0)  %


 


MCV   102 H   (80-98)  fL


 


MCH   32 H   (27-31)  pg


 


MCHC   32   (32-36)  %


 


Plt Count   200   (150-400)  K/uL


 


Sodium    142  (140-148)  mmol/L


 


Potassium    4.6  (3.6-5.2)  mmol/L


 


Chloride    110 H  (100-108)  mmol/L


 


Carbon Dioxide    28  (21-32)  mmol/L


 


Anion Gap    8.6  (5.0-14.0)  mmol/L


 


BUN    17  (7-18)  mg/dL


 


Creatinine    0.5 L  (0.6-1.0)  mg/dL


 


Est Cr Clr Drug Dosing    134.41  mL/min


 


Estimated GFR (MDRD)    > 60  (>60)  


 


Glucose    89  ()  mg/dL


 


Calcium    7.9 L  (8.5-10.1)  mg/dL


 


Phosphorus    4.6  (2.5-4.9)  mg/dL


 


Magnesium    2.0  (1.8-2.4)  mg/dL


 


Total Bilirubin    0.4  (0.2-1.0)  mg/dL


 


AST    71 H  (15-37)  U/L


 


ALT    131 H  (12-78)  U/L


 


Alkaline Phosphatase    75  ()  U/L


 


Total Protein    4.6 L  (6.4-8.2)  g/dL


 


Albumin    2.8 L  (3.4-5.0)  g/dL


 


Globulin    1.8 L  (2.3-3.5)  g/dL


 


Albumin/Globulin Ratio    1.6  (1.2-2.2)  


 


Crossmatch  See Detail    











Med Orders - Current: 


                               Current Medications





Acetaminophen (Tylenol)  650 mg PO Q4H PRN


   PRN Reason: PAIN/FEVER


   Last Admin: 08/28/20 04:12 Dose:  650 mg


   Documented by: 


Acetaminophen (Tylenol)  650 mg RECTAL Q4H PRN


   PRN Reason: PAIN/FEVER


Benzocaine/Menthol (Cepacol Sore Throat)  1 lozenge MUCMEM ASDIRECTED PRN


   PRN Reason: Sore Throat


Ropivacaine 36 ml/Dexamethasone 8 mg/Epinephrine HCl 0.4 mg/ Sodium Chloride 

41.6 ml  0 ml NERVRT ASDIRECTED [facility]


Heparin Sodium (Porcine) (Heparin Lock Flush 100 Units/Ml)  500 units FLUSH 

ASDIRECTED PRN


   PRN Reason: IV Use


   Last Admin: 08/28/20 07:32 Dose:  500 units


   Documented by: 


Fat Emulsion Intravenous (Intralipid 20%)  100 mls @ 10 mls/hr IV DAILY@1800 [facility]


   Last Admin: 09/02/20 17:00 Dose:  10 mls/hr


   Documented by: 


Dextrose/Lactated Ringer's (Dextrose 5%-Lactated Ringers)  1,000 mls @ 40 mls/hr

IV ASDIRECTED [facility]


   Last Admin: 09/02/20 16:58 Dose:  40 mls/hr


   Documented by: 


Multivitamins/Minerals 10 ml/Chromium/Copper/Manganese/Seleni/Zn 1 ml/ Amino 

Ac/Electrol/Dextrose/Calcium  1,011 mls @ 82 mls/hr IV .BY DURATION [facility]


   Last Admin: 09/02/20 20:29 Dose:  82 mls/hr


   Documented by: 


Amino Ac/Electrol/Dextrose/Calcium (Clinimix E 5/15)  1,000 mls @ 82 mls/hr IV 

.BY DURATION [facility]


   Last Admin: 09/03/20 08:29 Dose:  82 mls/hr


   Documented by: 


Albumin Human (Albumin 25%)  25 gm in 100 mls @ 25 mls/hr IV DAILY [facility]


   Stop: 09/03/20 12:59


   Last Admin: 09/03/20 08:28 Dose:  25 mls/hr


   Documented by: 


Ketamine HCl 50 mg/ Sodium (Chloride)  50 mls @ 18 mls/hr IV ASDIRECTED [facility]


Magnesium Sulfate 2 gm/ Sodium (Chloride)  104 mls @ 208 mls/hr IV ASDIRECTED 

[facility]


Magnesium Sulfate 3.5 gm/ (Sodium Chloride)  257 mls @ 32.125 mls/hr IV ONETIME 

ONE


   Stop: 09/03/20 15:59


Ketamine HCl (Ketalar)  30 mg IV ASDIRECTED [facility]


Non-Formulary Medication (Total Parenteral Nutrition, Central)  1,000 ml .XX 

.Continue Order [facility]


   Stop: 09/03/20 12:00


Pantoprazole Sodium (Protonix***)  40 mg PO ACBREAKFAST [facility]


   Last Admin: 09/03/20 08:28 Dose:  40 mg


   Documented by: 





Discontinued Medications





Bupivacaine HCl (Marcaine 0.5%) Confirm Administered Dose 50 ml .ROUTE .ST-MED 

ONE


   Stop: 08/27/20 06:39


   Last Admin: 08/27/20 11:02 Dose:  5 ml


   Documented by: 


Bupivacaine HCl (Marcaine 0.5%) Confirm Administered Dose 50 ml .ROUTE .ST-MED 

ONE


   Stop: 08/31/20 07:03


Bupivacaine HCl (Marcaine 0.5%) Confirm Administered Dose 50 ml .ROUTE .ST-MED 

ONE


   Stop: 08/31/20 07:04


Bupivacaine HCl (Marcaine 0.5%) Confirm Administered Dose 50 ml .ROUTE .STK-MED 

ONE


   Stop: 09/03/20 06:56


Cefazolin Sodium (Ancef) Confirm Administered Dose 1 gm .ROUTE .K-MED ONE


   Stop: 08/27/20 11:15


Ropivacaine 35 ml/Dexamethasone 8 mg/Epinephrine HCl 0.4 mg/ Sodium Chloride 

42.6 ml  0 ml NERVRT ASDIRECTED [facility]


   Last Admin: 08/31/20 13:20 Dose:  Not Given


   Documented by: 


Dexamethasone (Dexamethasone) Confirm Administered Dose 4 mg .ROUTE .STK-MED ONE


   Stop: 08/31/20 08:51


Dexamethasone (Dexamethasone) Confirm Administered Dose 4 mg .ROUTE .STK-MED ONE


   Stop: 09/03/20 07:10


Fentanyl (Sublimaze) Confirm Administered Dose 100 mcg .ROUTE .STK-MED ONE


   Stop: 08/27/20 10:10


Fentanyl (Sublimaze) Confirm Administered Dose 250 mcg .ROUTE .STK-MED ONE


   Stop: 08/31/20 13:13


Fentanyl (Sublimaze) Confirm Administered Dose 250 mcg .ROUTE .STK-MED ONE


   Stop: 09/03/20 07:10


Furosemide (Lasix)  20 mg IVPUSH ONETIME ONE


   Stop: 08/30/20 14:01


   Last Admin: 08/30/20 14:38 Dose:  20 mg


   Documented by: 


Glycopyrrolate (Robinul) Confirm Administered Dose 1 mg .ROUTE .STK-MED ONE


   Stop: 08/31/20 08:51


Glycopyrrolate (Robinul) Confirm Administered Dose 1 mg .ROUTE .STK-MED ONE


   Stop: 09/03/20 07:10


Heparin Sodium (Porcine) (Heparin Lock Flush 100 Units/Ml) Confirm Administered 

Dose 1,500 units .ROUTE .STK-MED ONE


   Stop: 08/27/20 06:39


   Last Admin: 08/27/20 11:03 Dose:  1,000 units


   Documented by: 


Dextrose/Lactated Ringer's (Dextrose 5%-Lactated Ringers)  1,000 mls @ 100 

mls/hr IV ASDIRECTED [facility]


   Stop: 08/27/20 18:00


   Last Admin: 08/27/20 14:58 Dose:  100 mls/hr


   Documented by: 


Multivitamins/Minerals 10 ml/Thiamine HCl 100 mg/ Magnesium Sulfate 2 gm/ Folic 

Acid 1 mg / Lactated Ringer's  1,015.2 mls @ 500 mls/hr IV ONETIME ONE


   Stop: 08/26/20 18:01


   Last Admin: 08/26/20 16:24 Dose:  500 mls/hr


   Documented by: 


Potassium Chloride 20 meq/Lidocaine HCl 2 ml/ Sodium Chloride  112 mls @ 56 

mls/hr IV Q2H [facility]


   Stop: 08/26/20 19:59


   Last Admin: 08/26/20 18:35 Dose:  56 mls/hr


   Documented by: 


Sodium Chloride (Normal Saline)  70 mls @ 3 mls/sec IV ASDIRECTED [facility]


   Stop: 08/26/20 14:31


   Last Admin: 08/26/20 14:39 Dose:  3 mls/sec


   Documented by: 


Albumin Human (Albumin 25%)  25 gm in 100 mls @ 25 mls/hr IV Q24H [facility]


   Stop: 08/30/20 13:59


   Last Admin: 08/30/20 12:37 Dose:  25 mls/hr


   Documented by: 


Albumin Human (Albumin 25%)  25 gm in 100 mls @ 25 mls/hr IV Q24H [facility]


   Stop: 08/30/20 17:59


   Last Admin: 08/30/20 16:01 Dose:  25 mls/hr


   Documented by: 


Sodium Chloride (Normal Saline) Confirm Administered Dose 10 mls @ as directed 

.ROUTE .STK-MED ONE


   Stop: 08/27/20 11:15


Multivitamins/Minerals 10 ml/Chromium/Copper/Manganese/Seleni/Zn 1 ml/ Amino 

Ac/Electrol/Dextrose/Calcium  1,011 mls @ 100 mls/hr IV .BY DURATION [facility]


   Last Admin: 08/27/20 17:50 Dose:  100 mls/hr


   Documented by: 


Amino Ac/Electrol/Dextrose/Calcium (Clinimix E 5/15)  1,000 mls @ 100 mls/hr IV 

.BY DURATION [facility]


   Last Admin: 08/28/20 10:16 Dose:  Not Given


   Documented by: 


Multivitamins/Minerals 10 ml/Chromium/Copper/Manganese/Seleni/Zn 1 ml/ Amino 

Ac/Electrol/Dextrose/Calcium  1,011 mls @ 100 mls/hr IV .BY DURATION [facility]


   Stop: 08/29/20 15:00


   Last Admin: 08/29/20 07:21 Dose:  100 mls/hr


   Documented by: 


Amino Ac/Electrol/Dextrose/Calcium (Clinimix E 5/15)  1,000 mls @ 100 mls/hr IV 

.BY DURATION [facility]


   Stop: 08/29/20 15:00


   Last Admin: 08/28/20 20:28 Dose:  100 mls/hr


   Documented by: 


Amino Ac/Electrol/Dextrose/Calcium (Clinimix E 5/15)  1,000 mls @ 100 mls/hr IV 

.Q10H [facility]


   Last Admin: 09/01/20 06:54 Dose:  100 mls/hr


   Documented by: 


Cefoxitin Sodium 2 gm/ Sodium (Chloride)  50 mls @ 100 mls/hr IV ONCALL ONE


   Stop: 08/31/20 10:59


   Last Admin: 08/31/20 13:15 Dose:  100 mls/hr


   Documented by: 


Lactated Ringer's (Ringers, Lactated) Confirm Administered Dose 1,000 mls @ as 

directed .ROUTE .STK-MED ONE


   Stop: 08/31/20 13:50


Cefoxitin Sodium 2 gm/ Sodium (Chloride)  50 mls @ 100 mls/hr IV ONETIME ONE


   Stop: 09/03/20 08:29


Iopamidol (Isovue-300 (61%))  50 ml PO ASDIRECTED ONE


   Stop: 08/26/20 12:44


   Last Admin: 08/26/20 13:02 Dose:  50 ml


   Documented by: 


Iopamidol (Isovue-300 (61%))  100 ml IV .AS DIRECTED DIANE


   Stop: 08/26/20 14:31


   Last Admin: 08/26/20 14:39 Dose:  100 ml


   Documented by: 


Lidocaine/Epinephrine (Xylocaine 1% With Epinephrine 1:100,000) Confirm 

Administered Dose 50 ml .ROUTE .STK-MED ONE


   Stop: 08/27/20 06:40


   Last Admin: 08/27/20 11:02 Dose:  5 ml


   Documented by: 


Lidocaine/Epinephrine (Xylocaine 1% With Epinephrine 1:100,000) Confirm 

Administered Dose 50 ml .ROUTE .STK-MED ONE


   Stop: 08/31/20 07:04


Lidocaine/Epinephrine (Xylocaine 1% With Epinephrine 1:100,000) Confirm Ad

ministered Dose 50 ml .ROUTE .STK-MED ONE


   Stop: 09/03/20 06:56


Meropenem (Merrem) Confirm Administered Dose 500 mg .ROUTE .STK-MED ONE


   Stop: 08/31/20 07:03


Meropenem (Merrem) Confirm Administered Dose 500 mg .ROUTE .STK-MED ONE


   Stop: 08/31/20 08:58


Midazolam HCl (Versed 1 Mg/Ml) Confirm Administered Dose 2 mg .ROUTE .STK-MED 

ONE


   Stop: 08/27/20 10:10


Neostigmine Methylsulfate (Neostigmine) Confirm Administered Dose 5 mg .ROUTE 

.STK-MED ONE


   Stop: 08/31/20 08:51


Neostigmine Methylsulfate (Neostigmine) Confirm Administered Dose 5 mg .ROUTE 

.STK-MED ONE


   Stop: 09/03/20 07:10


Non-Formulary Medication (Total Parenteral Nutrition, Central)  1,000 ml .XX 

.Continue Order [facility]


   Stop: 08/28/20 18:00


Non-Formulary Medication (Total Parenteral Nutrition, Central)  1,000 ml .XX 

.Continue Order [facility]


   Stop: 09/02/20 14:00


Ondansetron HCl (Zofran) Confirm Administered Dose 4 mg .ROUTE .STK-MED ONE


   Stop: 08/31/20 08:51


Ondansetron HCl (Zofran) Confirm Administered Dose 4 mg .ROUTE .STK-MED ONE


   Stop: 09/03/20 07:10


Pantoprazole Sodium (Protonix Iv***)  40 mg IV Q24H [facility]


   Last Admin: 08/27/20 15:00 Dose:  40 mg


   Documented by: 


Propofol (Diprivan  20 Ml) Confirm Administered Dose 200 mg .ROUTE .STK-MED ONE


   Stop: 08/27/20 10:10


Propofol (Diprivan  20 Ml) Confirm Administered Dose 200 mg .ROUTE .STK-MED ONE


   Stop: 08/27/20 11:14


Propofol (Diprivan  20 Ml) Confirm Administered Dose 200 mg .ROUTE .STK-MED ONE


   Stop: 09/03/20 07:10


Rocuronium Bromide (Zemuron) Confirm Administered Dose 50 mg .ROUTE .STK-MED ONE


   Stop: 09/03/20 07:10


Sodium Chloride (Saline Flush)  10 ml FLUSH ONETIME ONE


   Stop: 08/26/20 14:18


   Last Admin: 08/26/20 14:38 Dose:  10 ml


   Documented by: 


Succinylcholine Chloride (Quelicin) Confirm Administered Dose 200 mg .ROUTE 

.STK-MED ONE


   Stop: 09/03/20 07:10


Vitamin A Palmitate (Aquasol A)  100,000 unit IM DAILY [facility]


   Stop: 09/01/20 09:01


   Last Admin: 09/01/20 08:35 Dose:  100,000 unit


   Documented by: 











- Exam


General: Alert, Oriented, Cooperative, No Acute Distress


Lungs: Clear to Auscultation, Normal Respiratory Effort


Cardiovascular: Regular Rate, Regular Rhythm, No Murmurs


GI/Abdominal Exam: Soft, Non-Tender, No Organomegaly, No Distention


Extremities: Non-Tender, No Pedal Edema





Sepsis Event Note





- Evaluation


Sepsis Screening Result: No Definite Risk





- Focused Exam


Vital Signs: 


                                   Vital Signs











  Temp Pulse Resp BP Pulse Ox


 


 09/03/20 07:28      96


 


 09/03/20 07:00  95.6 F L  47 L  16  102/62  98


 


 09/03/20 03:13  96.4 F L  51 L  16  101/58 L  97


 


 09/02/20 22:30  96.3 F L  56 L  18  103/60  98














Consult PN Assessment/Plan


Problem List Initiated/Reviewed/Updated: Yes


My Orders Last 24 Hours: 


My Active Orders





09/03/20 10:18


Discontinue Telemetry Monitoring [Cardiac Monitoring Discontinue] [RC] Click to 

Edit 











Plan: 





ASSESSMENT AND RECOMMENDATIONS





BRADYCARDIA-she has remained in sinus rhythm, often bradycardic.  No further 

evidence of junctional rhythm or severe bradycardia.





SMALL BOWEL OBSTRUCTION AND SUPERIOR MESENTERIC ARTERY SYNDROME-cleared for 

surgery today with Dr. Lopez


-Postoperative care per Dr. Lopez

## 2020-09-04 RX ADMIN — I.V. FAT EMULSION SCH MLS/HR: 20 EMULSION INTRAVENOUS at 17:25

## 2020-09-04 RX ADMIN — SODIUM CHLORIDE SCH UNITS: 0.9 INJECTION, SOLUTION INTRAVENOUS at 08:41

## 2020-09-04 RX ADMIN — LEUCINE, PHENYLALANINE, LYSINE, METHIONINE, ISOLEUCINE, VALINE, HISTIDINE, THREONINE, TRYPTOPHAN, ALANINE, GLYCINE, ARGININE, PROLINE, SERINE, TYROSINE, SODIUM ACETATE, DIBASIC POTASSIUM PHOSPHATE, MAGNESIUM CHLORIDE, SODIUM CHLORIDE, CALCIUM CHLORIDE, DEXTROSE SCH MLS/HR
365; 280; 290; 200; 300; 290; 240; 210; 90; 1035; 515; 575; 340; 250; 20; 340; 261; 51; 59; 33; 15 INJECTION INTRAVENOUS at 08:40

## 2020-09-04 RX ADMIN — SODIUM CHLORIDE SCH UNITS: 0.9 INJECTION, SOLUTION INTRAVENOUS at 20:52

## 2020-09-04 RX ADMIN — LEUCINE, PHENYLALANINE, LYSINE, METHIONINE, ISOLEUCINE, VALINE, HISTIDINE, THREONINE, TRYPTOPHAN, ALANINE, GLYCINE, ARGININE, PROLINE, SERINE, TYROSINE, SODIUM ACETATE, DIBASIC POTASSIUM PHOSPHATE, MAGNESIUM CHLORIDE, SODIUM CHLORIDE, CALCIUM CHLORIDE, DEXTROSE SCH MLS/HR
365; 280; 290; 200; 300; 290; 240; 210; 90; 1035; 515; 575; 340; 250; 20; 340; 261; 51; 59; 33; 15 INJECTION INTRAVENOUS at 20:50

## 2020-09-04 NOTE — CR
UG[employer]

 

HISTORY: Postbariatric surgery

 

FINDINGS: Patient swallowed water-soluble contrast. Upright views of the abdomen

show no evidence of extravasation or obstruction.

 

IMPRESSION: Status post bariatric surgery

 

No extravasation or obstruction seen

## 2020-09-04 NOTE — PCM.CONSN
- General Info


Date of Service: 09/04/20


Subjective Update: 





Ms. Bernardo is status post exploratory laparotomy done yesterday by Dr. Lopez.  

She has remained hemodynamically stable and although heart rate is been slow she

is remained asymptomatic.  There was no further evidence of severe bradycardia 

noted with surgery.


Functional Status: Reports: Tolerating Diet





- Review of Systems


Pulmonary: Reports: No Symptoms


Cardiovascular: Reports: No Symptoms


Gastrointestinal: Reports: Abdominal Pain.  Denies: Difficulty Swallowing, 

Nausea, Vomiting





- Patient Data


Vitals - Most Recent: 


                                Last Vital Signs











Temp  97.4 F   09/04/20 08:38


 


Pulse  50 L  09/04/20 08:38


 


Resp  18   09/04/20 08:38


 


BP  99/60   09/04/20 08:38


 


Pulse Ox  99   09/04/20 08:38











Weight - Most Recent: 160 lb


I&O - Last 24 Hours: 


                                 Intake & Output











 09/03/20 09/04/20 09/04/20





 22:59 06:59 14:59


 


Intake Total 350 1911 


 


Output Total 1700 2200 350


 


Balance -1350 -289 -350











Lab Results Last 24 Hours: 


                         Laboratory Results - last 24 hr











  09/04/20 09/04/20 Range/Units





  03:17 03:17 


 


WBC  8.3   (4.5-11.0)  K/uL


 


RBC  3.36   (3.30-5.50)  M/uL


 


Hgb  10.8 L   (12.0-15.0)  g/dL


 


Hct  33.9 L   (36.0-48.0)  %


 


MCV  101 H   (80-98)  fL


 


MCH  32 H   (27-31)  pg


 


MCHC  32   (32-36)  %


 


Plt Count  228   (150-400)  K/uL


 


Sodium   137 L  (140-148)  mmol/L


 


Potassium   5.7 H  (3.6-5.2)  mmol/L


 


Chloride   104  (100-108)  mmol/L


 


Carbon Dioxide   31  (21-32)  mmol/L


 


Anion Gap   7.7  (5.0-14.0)  mmol/L


 


BUN   13  (7-18)  mg/dL


 


Creatinine   0.8  D  (0.6-1.0)  mg/dL


 


Est Cr Clr Drug Dosing   84.01  mL/min


 


Estimated GFR (MDRD)   > 60  (>60)  


 


Glucose   155 H  ()  mg/dL


 


Calcium   8.2 L  (8.5-10.1)  mg/dL


 


Phosphorus   4.3  (2.5-4.9)  mg/dL


 


Magnesium   2.5 H  (1.8-2.4)  mg/dL


 


Total Bilirubin   0.6  (0.2-1.0)  mg/dL


 


AST   88 H  (15-37)  U/L


 


ALT   158 H  (12-78)  U/L


 


Alkaline Phosphatase   104  ()  U/L


 


NT-Pro-B Natriuret Pep   161 H  (5-125)  pg/mL


 


Total Protein   5.5 L  (6.4-8.2)  g/dL


 


Albumin   3.1 L  (3.4-5.0)  g/dL


 


Globulin   2.4  (2.3-3.5)  g/dL


 


Albumin/Globulin Ratio   1.3  (1.2-2.2)  











Med Orders - Current: 


                               Current Medications





Acetaminophen (Tylenol Extra Strength)  1,000 mg PO Q8H [facility]


   Last Admin: 09/04/20 09:56 Dose:  1,000 mg


   Documented by: 


Acetaminophen (Tylenol Extra Strength)  500 mg PO Q8H PRN


   PRN Reason: MILD PAIN


Benzocaine/Menthol (Cepacol Sore Throat)  1 lozenge MUCMEM ASDIRECTED PRN


   PRN Reason: Sore Throat


Bisacodyl (Dulcolax)  10 mg PO BID [facility]


   Last Admin: 09/04/20 09:56 Dose:  10 mg


   Documented by: 


Celecoxib (Celebrex)  200 mg PO BID [facility]


   Last Admin: 09/04/20 08:41 Dose:  200 mg


   Documented by: 


Cyanocobalamin (Vitamin B12)  1,000 mcg IM ONETIME ONE


   Stop: 09/05/20 09:01


Cyclobenzaprine HCl (Flexeril)  10 mg PO Q8H PRN


   PRN Reason: Muscle Spasm


Diphenhydramine HCl (Benadryl)  50 mg IVPUSH Q4H PRN


   PRN Reason: ITCHING


Docusate Sodium (Colace)  100 mg PO BID [facility]


   Last Admin: 09/04/20 09:56 Dose:  100 mg


   Documented by: 


Heparin Sodium (Porcine) (Heparin Lock Flush 100 Units/Ml)  500 units FLUSH 

ASDIRECTED PRN


   PRN Reason: IV Use


   Last Admin: 08/28/20 07:32 Dose:  500 units


   Documented by: 


Heparin Sodium (Porcine) (Heparin Sodium)  5,000 units SUBCUT Q12H [facility]


   Last Admin: 09/04/20 08:41 Dose:  5,000 units


   Documented by: 


Hydroxyzine HCl (Vistaril)  100 mg IM Q4H PRN


   PRN Reason: pain


Fat Emulsion Intravenous (Intralipid 20%)  100 mls @ 10 mls/hr IV DAILY@1800 [facility]


   Last Admin: 09/03/20 17:08 Dose:  10 mls/hr


   Documented by: 


Multivitamins/Minerals 10 ml/Chromium/Copper/Manganese/Seleni/Zn 1 ml/ Amino 

Ac/Electrol/Dextrose/Calcium  1,011 mls @ 82 mls/hr IV .BY DURATION [facility]


   Last Admin: 09/03/20 20:35 Dose:  82 mls/hr


   Documented by: 


Amino Ac/Electrol/Dextrose/Calcium (Clinimix E 5/15)  1,000 mls @ 82 mls/hr IV 

.BY DURATION [facility]


   Last Admin: 09/04/20 08:40 Dose:  82 mls/hr


   Documented by: 


Cefoxitin Sodium 2 gm/ Sodium (Chloride)  50 mls @ 100 mls/hr IV Q6H [facility]


   Stop: 09/05/20 00:29


   Last Admin: 09/04/20 05:28 Dose:  100 mls/hr


   Documented by: 


Dextrose/Lactated Ringer's (Dextrose 5%-Lactated Ringers)  1,000 mls @ 25 mls/hr

IV ASDIRECTED [facility]


Labetalol HCl (Normodyne)  5 mg IVPUSH Q5M PRN


   PRN Reason: SBP over 160 OR DBP over 95


Metoclopramide HCl (Reglan)  10 mg IVPUSH Q6H PRN


   PRN Reason: NAUSEA NOT CONTROL BY ZOFRAN


Naloxone HCl (Narcan)  0.1 mg IV ASDIRECTED PRN


   PRN Reason: decreased respiratory rate


Ondansetron HCl (Zofran)  4 mg IVPUSH Q4H PRN


   PRN Reason: Nausea/Vomiting


Oxycodone HCl (Oxycodone)  5 mg PO Q4H PRN


   PRN Reason: PAIN


   Last Admin: 09/04/20 09:56 Dose:  5 mg


   Documented by: 


Pantoprazole Sodium (Protonix Granules***)  40 mg PO Q24H [facility]


Scopolamine (Transderm-Scop)  1.5 mg TOP Q72H PRN


   PRN Reason: *


Vitamin A (Vitamin A)  50,000 units PO DAILY DIANE





Discontinued Medications





Acetaminophen (Tylenol)  650 mg PO Q4H PRN


   PRN Reason: PAIN/FEVER


   Last Admin: 08/28/20 04:12 Dose:  650 mg


   Documented by: 


Acetaminophen (Tylenol)  650 mg RECTAL Q4H PRN


   PRN Reason: PAIN/FEVER


Benzocaine/Menthol (Cepacol Sore Throat)  1 lozenge MUCMEM ASDIRECTED PRN


   PRN Reason: Sore Throat


Bupivacaine HCl (Marcaine 0.5%) Confirm Administered Dose 50 ml .ROUTE .STK-MED 

ONE


   Stop: 08/27/20 06:39


   Last Admin: 08/27/20 11:02 Dose:  5 ml


   Documented by: 


Bupivacaine HCl (Marcaine 0.5%) Confirm Administered Dose 50 ml .ROUTE .STK-MED 

ONE


   Stop: 08/31/20 07:03


Bupivacaine HCl (Marcaine 0.5%) Confirm Administered Dose 50 ml .ROUTE .STK-MED 

ONE


   Stop: 08/31/20 07:04


Bupivacaine HCl (Marcaine 0.5%) Confirm Administered Dose 50 ml .ROUTE .STK-MED 

ONE


   Stop: 09/03/20 06:56


Calcium Gluconate (Calcium Gluconate)  1 gm IVPUSH ONETIME PRN


   PRN Reason: SX MAGNESIUM TOXICITY


   Stop: 09/03/20 23:59


Cefazolin Sodium (Ancef) Confirm Administered Dose 1 gm .ROUTE .STK-MED ONE


   Stop: 08/27/20 11:15


Ropivacaine 35 ml/Dexamethasone 8 mg/Epinephrine HCl 0.4 mg/ Sodium Chloride 

42.6 ml  0 ml NERVRT ASDIRECTED [facility]


   Last Admin: 08/31/20 13:20 Dose:  Not Given


   Documented by: 


Ropivacaine 36 ml/Dexamethasone 8 mg/Epinephrine HCl 0.4 mg/ Sodium Chloride 

41.6 ml  0 ml NERVRT ASDIRECTED [facility]


   Last Admin: 09/03/20 14:25 Dose:  80 syringe


   Documented by: 


Dexamethasone (Dexamethasone) Confirm Administered Dose 4 mg .ROUTE .STK-MED ONE


   Stop: 08/31/20 08:51


Dexamethasone (Dexamethasone) Confirm Administered Dose 4 mg .ROUTE .STK-MED ONE


   Stop: 09/03/20 07:10


Fentanyl (Sublimaze) Confirm Administered Dose 100 mcg .ROUTE .STK-MED ONE


   Stop: 08/27/20 10:10


Fentanyl (Sublimaze) Confirm Administered Dose 250 mcg .ROUTE .RUST-MED ONE


   Stop: 08/31/20 13:13


Fentanyl (Sublimaze) Confirm Administered Dose 250 mcg .ROUTE .RUST-MED ONE


   Stop: 09/03/20 07:10


Fentanyl (Sublimaze) Confirm Administered Dose 250 mcg .ROUTE .RUST-MED ONE


   Stop: 09/03/20 13:25


Furosemide (Lasix)  20 mg IVPUSH ONETIME ONE


   Stop: 08/30/20 14:01


   Last Admin: 08/30/20 14:38 Dose:  20 mg


   Documented by: 


Furosemide (Lasix)  20 mg IV ONETIME ONE


   Stop: 09/04/20 09:31


   Last Admin: 09/04/20 09:56 Dose:  20 mg


   Documented by: 


Glycopyrrolate (Robinul) Confirm Administered Dose 1 mg .ROUTE .K-MED ONE


   Stop: 08/31/20 08:51


Glycopyrrolate (Robinul) Confirm Administered Dose 1 mg .ROUTE .RUST-MED ONE


   Stop: 09/03/20 07:10


Heparin Sodium (Porcine) (Heparin Lock Flush 100 Units/Ml) Confirm Administered 

Dose 1,500 units .ROUTE .RUST-MED ONE


   Stop: 08/27/20 06:39


   Last Admin: 08/27/20 11:03 Dose:  1,000 units


   Documented by: 


Heparin Sodium (Porcine) (Heparin Lock Flush 100 Units/Ml) Confirm Administered 

Dose 500 units .ROUTE .RUST-MED ONE


   Stop: 09/03/20 12:56


   Last Admin: 09/03/20 12:56 Dose:  500 units


   Documented by: 


Hydromorphone HCl (Dilaudid Pca 15 Mg In Ns 30 Ml)  0 mg IV ASDIRECTED PRN; 

Protocol


   PRN Reason: Pain


   Last Admin: 09/03/20 15:00 Dose:  0.3 mg


   Documented by: 


Dextrose/Lactated Ringer's (Dextrose 5%-Lactated Ringers)  1,000 mls @ 100 

mls/hr IV ASDIRECTED [facility]


   Stop: 08/27/20 18:00


   Last Admin: 08/27/20 14:58 Dose:  100 mls/hr


   Documented by: 


Multivitamins/Minerals 10 ml/Thiamine HCl 100 mg/ Magnesium Sulfate 2 gm/ Folic 

Acid 1 mg / Lactated Ringer's  1,015.2 mls @ 500 mls/hr IV ONETIME ONE


   Stop: 08/26/20 18:01


   Last Admin: 08/26/20 16:24 Dose:  500 mls/hr


   Documented by: 


Potassium Chloride 20 meq/Lidocaine HCl 2 ml/ Sodium Chloride  112 mls @ 56 

mls/hr IV Q2H [facility]


   Stop: 08/26/20 19:59


   Last Admin: 08/26/20 18:35 Dose:  56 mls/hr


   Documented by: 


Sodium Chloride (Normal Saline)  70 mls @ 3 mls/sec IV ASDIRECTED [facility]


   Stop: 08/26/20 14:31


   Last Admin: 08/26/20 14:39 Dose:  3 mls/sec


   Documented by: 


Albumin Human (Albumin 25%)  25 gm in 100 mls @ 25 mls/hr IV Q24H [facility]


   Stop: 08/30/20 13:59


   Last Admin: 08/30/20 12:37 Dose:  25 mls/hr


   Documented by: 


Albumin Human (Albumin 25%)  25 gm in 100 mls @ 25 mls/hr IV Q24H [facility]


   Stop: 08/30/20 17:59


   Last Admin: 08/30/20 16:01 Dose:  25 mls/hr


   Documented by: 


Sodium Chloride (Normal Saline) Confirm Administered Dose 10 mls @ as directed 

.ROUTE .STK-MED ONE


   Stop: 08/27/20 11:15


Multivitamins/Minerals 10 ml/Chromium/Copper/Manganese/Seleni/Zn 1 ml/ Amino 

Ac/Electrol/Dextrose/Calcium  1,011 mls @ 100 mls/hr IV .BY DURATION [facility]


   Last Admin: 08/27/20 17:50 Dose:  100 mls/hr


   Documented by: 


Amino Ac/Electrol/Dextrose/Calcium (Clinimix E 5/15)  1,000 mls @ 100 mls/hr IV 

.BY DURATION [facility]


   Last Admin: 08/28/20 10:16 Dose:  Not Given


   Documented by: 


Dextrose/Lactated Ringer's (Dextrose 5%-Lactated Ringers)  1,000 mls @ 40 mls/hr

IV ASDIRECTED [facility]


   Last Admin: 09/02/20 16:58 Dose:  40 mls/hr


   Documented by: 


Multivitamins/Minerals 10 ml/Chromium/Copper/Manganese/Seleni/Zn 1 ml/ Amino 

Ac/Electrol/Dextrose/Calcium  1,011 mls @ 100 mls/hr IV .BY DURATION [facility]


   Stop: 08/29/20 15:00


   Last Admin: 08/29/20 07:21 Dose:  100 mls/hr


   Documented by: 


Amino Ac/Electrol/Dextrose/Calcium (Clinimix E 5/15)  1,000 mls @ 100 mls/hr IV 

.BY DURATION [facility]


   Stop: 08/29/20 15:00


   Last Admin: 08/28/20 20:28 Dose:  100 mls/hr


   Documented by: 


Amino Ac/Electrol/Dextrose/Calcium (Clinimix E 5/15)  1,000 mls @ 100 mls/hr IV 

.Q10H [facility]


   Last Admin: 09/01/20 06:54 Dose:  100 mls/hr


   Documented by: 


Cefoxitin Sodium 2 gm/ Sodium (Chloride)  50 mls @ 100 mls/hr IV ONCALL ONE


   Stop: 08/31/20 10:59


   Last Admin: 08/31/20 13:15 Dose:  100 mls/hr


   Documented by: 


Lactated Ringer's (Ringers, Lactated) Confirm Administered Dose 1,000 mls @ as 

directed .ROUTE .STK-MED ONE


   Stop: 08/31/20 13:50


Albumin Human (Albumin 25%)  25 gm in 100 mls @ 25 mls/hr IV DAILY [facility]


   Stop: 09/03/20 12:59


   Last Admin: 09/03/20 08:28 Dose:  25 mls/hr


   Documented by: 


Cefoxitin Sodium 2 gm/ Sodium (Chloride)  50 mls @ 100 mls/hr IV ONETIME ONE


   Stop: 09/03/20 08:29


   Last Admin: 09/03/20 12:25 Dose:  100 mls/hr


   Documented by: 


Ketamine HCl 50 mg/ Sodium (Chloride)  50 mls @ 18 mls/hr IV ASDIRECTED [facility]


Magnesium Sulfate 2 gm/ Sodium (Chloride)  104 mls @ 208 mls/hr IV ASDIRECTED 

[facility]


Magnesium Sulfate 3.5 gm/ (Sodium Chloride)  257 mls @ 32.125 mls/hr IV ONETIME 

ONE


   Stop: 09/03/20 15:59


   Last Admin: 09/03/20 15:35 Dose:  32.125 mls/hr


   Documented by: 


Lactated Ringer's (Ringers, Lactated) Confirm Administered Dose 1,000 mls @ as 

directed .ROUTE .STK-MED ONE


   Stop: 09/03/20 13:07


Dextrose/Lactated Ringer's (Dextrose 5%-Lactated Ringers)  1,000 mls @ 50 mls/hr

IV ASDIRECTED [facility]


   Last Admin: 09/03/20 17:07 Dose:  50 mls/hr


   Documented by: 


Iopamidol (Isovue-300 (61%))  50 ml PO ASDIRECTED ONE


   Stop: 08/26/20 12:44


   Last Admin: 08/26/20 13:02 Dose:  50 ml


   Documented by: 


Iopamidol (Isovue-300 (61%))  100 ml IV .AS DIRECTED [facility]


   Stop: 08/26/20 14:31


   Last Admin: 08/26/20 14:39 Dose:  100 ml


   Documented by: 


Iopamidol (Isovue-300 (61%))  50 ml PO ASDIRECTED [facility]


   Stop: 09/04/20 02:56


   Last Admin: 09/04/20 03:05 Dose:  50 ml


   Documented by: 


Ketamine HCl (Ketalar)  30 mg IV ASDIRECTED [facility]


Lidocaine/Epinephrine (Xylocaine 1% With Epinephrine 1:100,000) Confirm 

Administered Dose 50 ml .ROUTE .STK-MED ONE


   Stop: 08/27/20 06:40


   Last Admin: 08/27/20 11:02 Dose:  5 ml


   Documented by: 


Lidocaine/Epinephrine (Xylocaine 1% With Epinephrine 1:100,000) Confirm 

Administered Dose 50 ml .ROUTE .STK-MED ONE


   Stop: 08/31/20 07:04


Lidocaine/Epinephrine (Xylocaine 1% With Epinephrine 1:100,000) Confirm 

Administered Dose 50 ml .ROUTE .STK-MED ONE


   Stop: 09/03/20 06:56


Meropenem (Merrem) Confirm Administered Dose 500 mg .ROUTE .STK-MED ONE


   Stop: 08/31/20 07:03


Meropenem (Merrem) Confirm Administered Dose 500 mg .ROUTE .STK-MED ONE


   Stop: 08/31/20 08:58


Meropenem (Merrem) Confirm Administered Dose 500 mg .ROUTE .STK-MED ONE


   Stop: 09/03/20 13:52


   Last Admin: 09/03/20 13:55 Dose:  500 mg


   Documented by: 


Midazolam HCl (Versed 1 Mg/Ml) Confirm Administered Dose 2 mg .ROUTE .STK-MED 

ONE


   Stop: 08/27/20 10:10


Naloxone HCl (Narcan)  0.1 mg IVPUSH Q2M PRN


   PRN Reason: Respiratory Distress


Neostigmine Methylsulfate (Neostigmine) Confirm Administered Dose 5 mg .ROUTE 

.STK-MED ONE


   Stop: 08/31/20 08:51


Neostigmine Methylsulfate (Neostigmine) Confirm Administered Dose 5 mg .ROUTE 

.STK-MED ONE


   Stop: 09/03/20 07:10


Non-Formulary Medication (Total Parenteral Nutrition, Central)  1,000 ml .XX 

.Continue Order [facility]


   Stop: 08/28/20 18:00


Non-Formulary Medication (Total Parenteral Nutrition, Central)  1,000 ml .XX 

.Continue Order [facility]


   Stop: 09/02/20 14:00


Non-Formulary Medication (Total Parenteral Nutrition, Central)  1,000 ml .XX 

.Continue Order [facility]


   Stop: 09/03/20 12:00


Ondansetron HCl (Zofran) Confirm Administered Dose 4 mg .ROUTE .STK-MED ONE


   Stop: 08/31/20 08:51


Ondansetron HCl (Zofran) Confirm Administered Dose 4 mg .ROUTE .STK-MED ONE


   Stop: 09/03/20 07:10


Pantoprazole Sodium (Protonix Iv***)  40 mg IV Q24H [facility]


   Last Admin: 08/27/20 15:00 Dose:  40 mg


   Documented by: 


Pantoprazole Sodium (Protonix***)  40 mg PO ACBREAKFAST [facility]


   Last Admin: 09/03/20 08:28 Dose:  40 mg


   Documented by: 


Pantoprazole Sodium (Protonix Iv***)  40 mg IVPUSH Q24H [facility]


   Last Admin: 09/03/20 17:08 Dose:  40 mg


   Documented by: 


Propofol (Diprivan  20 Ml) Confirm Administered Dose 200 mg .ROUTE .STK-MED ONE


   Stop: 08/27/20 10:10


Propofol (Diprivan  20 Ml) Confirm Administered Dose 200 mg .ROUTE .STK-MED ONE


   Stop: 08/27/20 11:14


Propofol (Diprivan  20 Ml) Confirm Administered Dose 200 mg .ROUTE .STK-MED ONE


   Stop: 09/03/20 07:10


Rocuronium Bromide (Zemuron) Confirm Administered Dose 50 mg .ROUTE .STK-MED ONE


   Stop: 09/03/20 07:10


Sodium Chloride (Saline Flush)  10 ml FLUSH ONETIME ONE


   Stop: 08/26/20 14:18


   Last Admin: 08/26/20 14:38 Dose:  10 ml


   Documented by: 


Succinylcholine Chloride (Quelicin) Confirm Administered Dose 200 mg .ROUTE 

.STK-MED ONE


   Stop: 09/03/20 07:10


Vitamin A Palmitate (Aquasol A)  100,000 unit IM DAILY [facility]


   Stop: 09/01/20 09:01


   Last Admin: 09/01/20 08:35 Dose:  100,000 unit


   Documented by: 











- Exam


Quality Assessment: DVT Prophylaxis


General: Alert, Oriented, Cooperative, Moderate Distress


Lungs: Clear to Auscultation, Normal Respiratory Effort


Cardiovascular: Regular Rhythm, No Murmurs, Bradycardia


Back Exam: Normal Inspection


Extremities: Non-Tender, No Pedal Edema





Sepsis Event Note





- Evaluation


Sepsis Screening Result: No Definite Risk





- Focused Exam


Vital Signs: 


                                   Vital Signs











  Temp Pulse Pulse Resp BP BP Pulse Ox


 


 09/04/20 08:38  97.4 F  50 L   18  99/60   99


 


 09/04/20 07:43  97.6 F  48 L   16   97/63  96


 


 09/04/20 07:25        95


 


 09/04/20 03:00  96.4 F L  47 L   16  107/68   97


 


 09/04/20 01:00        95


 


 09/03/20 23:09  96.4 F L   46 L  16  98/61   96














Consult PN Assessment/Plan


Problem List Initiated/Reviewed/Updated: Yes


Plan: 





ASSESSMENT AND RECOMMENDATIONS





BRADYCARDIA-she has remained in sinus rhythm, often bradycardic.  No further 

evidence of junctional rhythm or severe bradycardia.





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 

exploratory laparotomy yesterday


-Postoperative care per Dr. Lopez





Ms. Bernardo has been stable concerning her bradycardia, hospitalist service will 

sign off at this time.  Please feel free to reconsult if we can be of further 

assistance in medical management during her hospital stay.

## 2020-09-04 NOTE — PN
DATE OF SERVICE:  09/04/2020

 

The patient is postoperative day #1 from an exploratory laparotomy with a Ranjeet-en-Y drainage

of the duodenum above right point of superior mesenteric artery syndrome type narrowing.

She also had a significant area of small bowel volvulus which was retracted and revision of

the jejunojejunostomy.  Upper GI x-ray looks good this morning.  She is feeling fairly good.

We will go up to a step 2 diet today, switch over to oral pain medication, and she might be

ready for discharge home tomorrow.  We will keep the TPN going at 82 mL an hour for today.

If she does go home, she should have the TPN turned down to 40 mL an hour for a few hours.

Her IM vitamin course is completed, we will start the 50,000 units of vitamin A orally

today, which she will go home with additional 14 days.

 

 

 

 

Terence Lopez MD

DD:  09/04/2020 06:49:47

DT:  09/04/2020 10:19:37

Job #:  [id number]

## 2020-09-05 RX ADMIN — LEUCINE, PHENYLALANINE, LYSINE, METHIONINE, ISOLEUCINE, VALINE, HISTIDINE, THREONINE, TRYPTOPHAN, ALANINE, GLYCINE, ARGININE, PROLINE, SERINE, TYROSINE, SODIUM ACETATE, DIBASIC POTASSIUM PHOSPHATE, MAGNESIUM CHLORIDE, SODIUM CHLORIDE, CALCIUM CHLORIDE, DEXTROSE SCH
365; 280; 290; 200; 300; 290; 240; 210; 90; 1035; 515; 575; 340; 250; 20; 340; 261; 51; 59; 33; 15 INJECTION INTRAVENOUS at 13:11

## 2020-09-05 RX ADMIN — SODIUM CHLORIDE SCH UNITS: 0.9 INJECTION, SOLUTION INTRAVENOUS at 07:47

## 2020-09-06 NOTE — CONS
DATE OF SERVICE:  09/05/2020

 

REFERRING PHYSICIAN:

 

CONSULTING PHYSICIAN:  Patric Crane MD

 

REASON FOR CONSULTATION:  __________

 

HISTORY:  This is a pleasant female who underwent an exploratory laparotomy, 
small bowel

resection due to SMA syndrome on 09/03/2020.  She is also on TPN for 
malnutrition.  Her pain

currently is 1 to 2/10 and is controlled.

 

PAST MEDICAL HISTORY:  History of bradycardia, chronic diarrhea, history of 
Ranjeet-en-Y,

gallbladder removal, abdominal hernia repair, active smoking.

 

REVIEW OF SYSTEMS:  HEENT:  No symptoms.

GENERAL:  The patient is resting comfortably.

CARDIOVASCULAR:  No chest pain.

RESPIRATORY:  No shortness of breath.

GASTROINTESTINAL:  She is having bowel movements.

GENITOURINARY:  No dysuria.

NEUROLOGICAL:  No symptoms.

PSYCHIATRIC:  No depression.

 

Remainder of review of systems was reviewed and is negative.

 

SOCIAL HISTORY:  She is a smoker.

 

FAMILY HISTORY:  Noncontributory.

 

PHYSICAL EXAMINATION:

VITAL SIGNS:  Temperature 96.3, blood pressure 125/68, pulse 49, respirations 
16, 96% on

room air.

HEENT:  Pupils are equal.

NECK:  Supple.

LUNGS:  Clear.

CARDIOVASCULAR:  Regular rhythm and rate.  Incision healing well.

EXTREMITIES:  Full range of motion.

NEUROLOGIC:  Oriented x3.

PSYCHIATRIC:  No gross depression.

 

IMAGING DATA:  I did review an upper GI performed on 09/04/2020 which

showed no abnormalities.

 

ASSESSMENT:  Status post small bowel resection due to superior mesenteric artery
syndrome.

 

PLAN:  The patient will be discharged today.  Please see discharge summary and 
orders for

further details.

 

 

 

 

Patric Crane MD

DD:  09/06/2020 10:44:03

DT:  09/06/2020 16:53:10

Job #:  [id number]

MTDD

## 2020-09-08 NOTE — OR
DATE OF PROCEDURE:  08/27/2020

 

SURGEON:  Terence Lopez MD

 

PREOPERATIVE DIAGNOSIS:  Indication for central venous access.

 

POSTOPERATIVE DIAGNOSIS:  Indication for central venous access.

 

OPERATIVE PROCEDURE:  Placement of double-lumen Goff catheter via left subclavian vein

approach (13080).

 

ANESTHESIA:  Local plus IV sedation.

 

INDICATIONS FOR PROCEDURE:  This is a 45-year-old status post Ranjeet-en-Y gastric bypass,

presenting with severe malnutrition.  This appears to be related to a combination of

probable adhesions or persistent volvulus resulting in a multitude of bowel movements per

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

quite narrow angle between the aorta and superior mesenteric artery and significant dilation

of the duodenum proximal to that location.  Plan at this point has been to admit the

patient, begin the patient on a period of TPN in preparation for surgical correction

sometime few days from now after we reestablish a more adequate nutritional status.

Potential risks of the procedure including bleeding, infection, pneumohemothorax, or

vascular injury related to the catheter placement were all reviewed, and the patient wishes

to proceed.

 

DETAILS OF PROCEDURE:  The patient was taken to the operating room and placed in a supine

position.  After IV sedation was administered, the upper chest and neck areas were prepped

and draped.  The left subclavian area was anesthetized with 1% lidocaine, and the subclavian

vein cannulated.  A guidewire was manipulated into the superior vena cava.  Some additional

local was then injected roughly handbreadth below the original puncture site in the anterior

chest wall.  A separate stab wound was then placed.  The catheter was then tunneled between

those 2 locations, and the catheter was cut such that the tip would lie in the area of the

right atrial-vena caval junction.  Over the introducer and Peel-Away catheter, the Goff

catheter was then easily deployed, and good placement was confirmed fluoroscopically.  The

initial puncture site was then closed with 4-0 Vicryl subdermal stitch and Steri-Strips, and

these were placed where the catheter exited the skin.  It was closed and approximated with

some 3-0 nylon stitch.  Both ports were at this point aspirated with good return and flushed

with heparinized saline.  Dressing was applied.  The patient was taken to the recovery room

in satisfactory condition.

 

 

 

 

Terence Lopez MD

DD:  09/04/2020 19:36:04

DT:  09/08/2020 13:35:22

Job #:  [id number]

## 2020-09-08 NOTE — OR
DATE OF PROCEDURE:  09/03/2020

 

SURGEON:  Terence Lopez MD

 

PREOPERATIVE DIAGNOSES:

1. Partial small bowel obstruction.

2. 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

 

POSTOPERATIVE DIAGNOSES:

1. Partial small bowel obstruction secondary to small bowel volvulus.

2. Separate stricture at previous jejunojejunostomy.

3. 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

4. Extensive intraabdominal adhesions.

 

OPERATIVE PROCEDURES:  Exploratory laparotomy with lysis of adhesions and:

1. Reduction of small bowel volvulus, closure of internal hernia (40058).

2. Small bowel resection (70679).

3. Small bowel stricturoplasty (15283).

4. Ranjeet-en-Y duodenojejunostomy (08209).

5. Lysis of duodenal bands for mobilization of partially obstructed duodenum (18619).

6. Placement of Interceed mesh to limit recurrent adhesion formation between pelvic and

    abdominal wall and underlying viscera (72823).

 

ANESTHESIA:  General.

 

ASSISTANT:  Nancy Boo PA-C

 

INDICATIONS FOR PROCEDURE:  This is a 45-year-old female presenting with what appears to be

a partial small bowel obstruction.  As a result, the patient developed severe malnutrition

and was admitted earlier for initiation of TPN, and she now appears to be improved in that

regard enough to undergo a laparotomy and significant abdominal procedure. The patient was

also noted on the preoperative imaging to have a tight angle between the superior mesenteric

artery and the aorta, causing a visible compression of the duodenum at that level with the

more proximal duodenum being quite significantly dilated, all consistent with a superior

mesenteric artery syndrome.  Plan is to proceed with exploratory laparotomy with lysis of

adhesions and reduction of areas of small bowel volvulus or other points of obstruction,

possible bowel resection.  In addition to that, we will construct a Ranjeet-en-Y

duodenojejunostomy, an integral part of correction of superior mesenteric artery syndrome.

There is also lysis of the duodenal ([place]'s) bands which allows mobilization of the duodenum

downward away from the triangle between the aorta and superior mesenteric artery.  Potential

risks of the procedure including bleeding, infection, leaks from any GI tract closures,

problems with further bowel obstruction over time were all reviewed, and the patient wishes

to proceed.

 

DETAILS OF PROCEDURE:  The patient was taken to the operating room, and after general

endotracheal anesthesia was induced, a Powell catheter was inserted, and the abdomen prepped

and draped.  A midline incision from the xiphoid to the umbilicus was then made and carried

down through the skin and subcutaneous tissue into the peritoneal cavity.  Upon entering the

peritoneal cavity, quite a bit in the way of adhesions were encountered.  These were

gradually taken down.  The patient was noted to have a focal small bowel volvulus where the

jejunojejunostomy and portions of the attached bowel had rotated in a plane behind the Ranjeet

limb.  This was reduced and the mesenteric defect then closed with a 2-0 silk stitch.

 

The patient was noted to have a stricture at the previous jejunojejunostomy at this point.

This was corrected by means of opening the bowel just proximal to the anastomosis in the

Ranjeet limb and then stapling across that area as it entered the jejunojejunostomy.  This

created a wide opening and more of a straightforward entrance of the Ranjeet limb into that

area.  Common opening was then closed with ESTEBAN purple load.

 

Roughly 20 cm proximal to the jejunojejunostomy, the Ranjeet limb was then divided.  This

allowed this to be brought up through an opening made in the avascular portion of the

transverse mesocolon.  This then came up to the junction of the 2nd and 3rd portions of the

duodenum without tension.  A segment of this was then resected, which had become ischemic

due to the mobilization process.  At that point, the ligament of Treitz was divided and then

the bands  __________ over the duodenum were divided with a combination of blunt and cautery

dissection, which allowed visible release of the duodenum entering in a downward direction

at the level where it passed between the superior mesenteric artery and aorta. Once this

phase was corrected, then an enterotomy was placed in the mid second portion of the duodenum

and the end of the Ranjeet limb, which was brought up and fixed initially with suture to the

duodenum.  A 60 mm internal firing of the ESTEBAN tan load was then undertaken.  Common opening

was closed with a purple load.  Angles of anastomosis were reinforced with some 3-0 Vicryl

stitch.  There appeared to be a sound opening between the duodenum and the Ranjeet limb and

jejunum.  The point where the Ranjeet limb passed through the transverse mesocolon was then

reinforced with some 3-0 Vicryl stitch to try to prevent internal herniation at that level.

 

Small bowel continuity was then established with the remaining ends of the previously made

Ranjeet limb anastomosed to the point roughly 20 cm distal to the original jejunojejunostomy.

Then, sequence of staples were used for the duodenojejunostomy.  The angles of anastomosis

were then likewise reinforced with 3-0 Vicryl stitch and, in this case, the mesenteric

defect closed with a 2-0 silk stitch.  At this point, no further problems were noted.  The

abdomen was irrigated with antibiotic-containing saline solution.  Interceed mesh was then

placed underneath the incision from there down towards the pelvis to limit recurrent

adhesion formation between pelvis and abdominal wall.  The midline fascia was then

approximated with #2 Vicryl stitch, the subcutaneous tissue with 2 layers of 3-0 and 4-0

Vicryl stitch and then staples for skin.  Prior to closure, bilateral transversus abdominis

plane blocks were then placed, and the patient was taken to the recovery room in

satisfactory condition.

 

Physician assistant, Nancy Boo, played an essential role in assisting in this case,

helping to position the patient, retract structures as needed, as well as suturing and

cutting sutures when indicated.  Her presence improved patient safety and decreased

operative time.

 

 

 

 

Terence Lopez MD

DD:  09/07/2020 05:41:43

DT:  09/08/2020 09:07:09

Job #:  [id number]

## 2020-09-09 NOTE — PN
DATE OF SERVICE:  08/31/2020

 

Rojelio was in to undergo a surgical correction of a partial small bowel obstruction, along

with superior mesenteric artery syndrome.  Prior to induction in the operating room, she was

noted to have what appeared to be a sinus node dysfunction with a variable rate and possibly

some conduction delay of the sinus node-initiated rhythm.  Dr. Lopez was consulted and

felt it would be best to postpone the surgery and proceed with cardiac workup prior to

proceeding with anything surgical.  Therefore, the procedure was canceled and the patient

taken back to the room and will be seen by Dr. Lopez more formally later this afternoon,

being placed on telemetry in the interim.

 

 

 

 

Terence Lopez MD

DD:  09/05/2020 15:04:21

DT:  09/08/2020 16:11:12

Job #:  [id number]

## 2020-09-10 NOTE — DISCH
FINAL DIAGNOSES:

1. Severe malnutrition associated with partial obstruction secondary to small bowel

    volvulus and intra-abdominal adhesions.

2. Superior mesenteric artery syndrome.

3. Bariatric surgery status (status post Ranjeet-en-Y gastric bypass 2012).

4. Severe vitamin A deficiency.

5. History of hypertension.

6. History of gastroesophageal reflux disease.

7. History of multiple of nutritional deficiencies including vitamin A, zinc, copper, and

    iron.

8. History of prediabetes mellitus.

9. Sinus node dysfunction.

 

OPERATIVE PROCEDURES:

1. On 08/27/2020, placement of double-lumen Goff catheter via left subclavian vein

    approach.

    a.     Exploratory laparotomy with lysis of adhesions and reduction of small bowel

     volvulus and closure of internal hernia.

    b.     Small bowel resection.

    c.     Small bowel strictureplasty.

    d.     Ranjeet-en-Y duodenojejunostomy for treatment of superior mesenteric artery

     syndrome.

    e.     Patient did have duodenal suspensory ([place]'s) bands.

    f.     Placement of Interceed mesh to limit recurrent adhesion formation.

 

SUMMARY:  This is a 45-year-old female who underwent gastric bypass in 2012.  Had been doing

well until April, when she underwent exploratory laparotomy in [place], and had a surgical

treatment for volvulus. Since that operation, she has had severe problems with diarrhea and

developed quite a bit in way of edema related to hypoproteinemia and, among other things,

was noted to have severe vitamin A deficiency.  The patient was initially admitted for

central line placement and initiation of TPN.  Over a several day period, the TPN was

administered, and her overall nutritional status appeared to be adequate.  A CT scan was

consistent with a partial small bowel obstruction, along with vascular compression of the

duodenum, i.e. superior mesenteric artery syndrome.

 

On the date of her main surgery, which was 09/03/2020, the patient underwent the above-noted

procedure.  Postoperatively, she did quite well, proceeding normally at that point with

recurrent diarrhea, and was discharged home on postop day #2.  She may continue usual

medications plus oxycodone 5 mg q.4 hours p.r.n. pain, #20, and then vitamin A 50,000 units

daily x14 days.

 

Prior to the discharge, the patient had a 3-day course of 100,000 units of vitamin A IM x3

days and was initiated on oral vitamin A prior to discharge as well.  Her Goff catheter

was flushed with heparinized saline.  She will be following up with Nacny Boo in [facility], on 09/14/2020.

 

One additional aspect of this patient's hospitalization is that, prior to her undergoing the

laparotomy, she was noted to have dysfunctional sinus node findings.  The procedure was

initially canceled.  Over the next 48 hours, this was worked

up with an echocardiogram, which was essentially normal, and Dr. Lopez, the hospitalist,

confirmed with Cardiology,

who felt that nothing needed to be done at this time, other than watching the situation, and

subsequently the patient underwent the above-noted operation.